# Patient Record
Sex: MALE | Race: WHITE | NOT HISPANIC OR LATINO | Employment: OTHER | URBAN - NONMETROPOLITAN AREA
[De-identification: names, ages, dates, MRNs, and addresses within clinical notes are randomized per-mention and may not be internally consistent; named-entity substitution may affect disease eponyms.]

---

## 2024-06-26 ENCOUNTER — HOSPITAL ENCOUNTER (INPATIENT)
Facility: HOSPITAL | Age: 59
LOS: 2 days | Discharge: HOME | DRG: 571 | End: 2024-06-28
Attending: STUDENT IN AN ORGANIZED HEALTH CARE EDUCATION/TRAINING PROGRAM | Admitting: INTERNAL MEDICINE
Payer: MEDICARE

## 2024-06-26 ENCOUNTER — APPOINTMENT (OUTPATIENT)
Dept: CT IMAGING | Facility: HOSPITAL | Age: 59
DRG: 571 | End: 2024-06-26
Payer: MEDICARE

## 2024-06-26 ENCOUNTER — APPOINTMENT (OUTPATIENT)
Dept: GENERAL RADIOLOGY | Facility: HOSPITAL | Age: 59
DRG: 571 | End: 2024-06-26
Payer: MEDICARE

## 2024-06-26 DIAGNOSIS — N30.90 CYSTITIS: ICD-10-CM

## 2024-06-26 DIAGNOSIS — I87.2 VENOUS INSUFFICIENCY (CHRONIC) (PERIPHERAL): ICD-10-CM

## 2024-06-26 DIAGNOSIS — L97.922 NON-PRESSURE CHRONIC ULCER OF LEFT LOWER LEG WITH FAT LAYER EXPOSED: ICD-10-CM

## 2024-06-26 DIAGNOSIS — E87.1 HYPONATREMIA: Primary | ICD-10-CM

## 2024-06-26 DIAGNOSIS — L08.9 WOUND INFECTION: ICD-10-CM

## 2024-06-26 DIAGNOSIS — T14.8XXA WOUND INFECTION: ICD-10-CM

## 2024-06-26 PROBLEM — D64.9 ANEMIA: Status: ACTIVE | Noted: 2024-06-26

## 2024-06-26 PROBLEM — L97.929 LEG ULCER, LEFT: Status: ACTIVE | Noted: 2024-06-26

## 2024-06-26 PROBLEM — E66.01 MORBID OBESITY: Status: ACTIVE | Noted: 2024-06-26

## 2024-06-26 PROBLEM — N30.00 ACUTE CYSTITIS: Status: ACTIVE | Noted: 2024-06-26

## 2024-06-26 LAB
ALBUMIN SERPL-MCNC: 3.3 G/DL (ref 3.5–5.2)
ALBUMIN/GLOB SERPL: 0.8 G/DL
ALP SERPL-CCNC: 68 U/L (ref 39–117)
ALT SERPL W P-5'-P-CCNC: 10 U/L (ref 1–41)
ANION GAP SERPL CALCULATED.3IONS-SCNC: 12 MMOL/L (ref 5–15)
APTT PPP: 28.9 SECONDS (ref 24.5–36)
AST SERPL-CCNC: 10 U/L (ref 1–40)
BACTERIA UR QL AUTO: ABNORMAL /HPF
BASOPHILS # BLD AUTO: 0.03 10*3/MM3 (ref 0–0.2)
BASOPHILS NFR BLD AUTO: 0.3 % (ref 0–1.5)
BILIRUB SERPL-MCNC: 0.8 MG/DL (ref 0–1.2)
BILIRUB UR QL STRIP: NEGATIVE
BUN SERPL-MCNC: 21 MG/DL (ref 6–20)
BUN/CREAT SERPL: 12.4 (ref 7–25)
CALCIUM SPEC-SCNC: 8.7 MG/DL (ref 8.6–10.5)
CHLORIDE SERPL-SCNC: 96 MMOL/L (ref 98–107)
CLARITY UR: ABNORMAL
CO2 SERPL-SCNC: 19 MMOL/L (ref 22–29)
COLOR UR: YELLOW
CREAT SERPL-MCNC: 1.7 MG/DL (ref 0.76–1.27)
CRP SERPL-MCNC: 23.83 MG/DL (ref 0–0.5)
D-LACTATE SERPL-SCNC: 1.2 MMOL/L (ref 0.5–2)
DEPRECATED RDW RBC AUTO: 45.7 FL (ref 37–54)
EGFRCR SERPLBLD CKD-EPI 2021: 45.9 ML/MIN/1.73
EOSINOPHIL # BLD AUTO: 0.02 10*3/MM3 (ref 0–0.4)
EOSINOPHIL NFR BLD AUTO: 0.2 % (ref 0.3–6.2)
ERYTHROCYTE [DISTWIDTH] IN BLOOD BY AUTOMATED COUNT: 13 % (ref 12.3–15.4)
GLOBULIN UR ELPH-MCNC: 4.3 GM/DL
GLUCOSE SERPL-MCNC: 123 MG/DL (ref 65–99)
GLUCOSE UR STRIP-MCNC: NEGATIVE MG/DL
HBA1C MFR BLD: 6.2 % (ref 4.8–5.6)
HCT VFR BLD AUTO: 35.4 % (ref 37.5–51)
HGB BLD-MCNC: 11.9 G/DL (ref 13–17.7)
HGB UR QL STRIP.AUTO: ABNORMAL
HYALINE CASTS UR QL AUTO: ABNORMAL /LPF
IMM GRANULOCYTES # BLD AUTO: 0.08 10*3/MM3 (ref 0–0.05)
IMM GRANULOCYTES NFR BLD AUTO: 0.7 % (ref 0–0.5)
INR PPP: 1.12 (ref 0.91–1.09)
KETONES UR QL STRIP: ABNORMAL
LEUKOCYTE ESTERASE UR QL STRIP.AUTO: ABNORMAL
LYMPHOCYTES # BLD AUTO: 1 10*3/MM3 (ref 0.7–3.1)
LYMPHOCYTES NFR BLD AUTO: 8.8 % (ref 19.6–45.3)
MAGNESIUM SERPL-MCNC: 1.7 MG/DL (ref 1.6–2.6)
MCH RBC QN AUTO: 32 PG (ref 26.6–33)
MCHC RBC AUTO-ENTMCNC: 33.6 G/DL (ref 31.5–35.7)
MCV RBC AUTO: 95.2 FL (ref 79–97)
MONOCYTES # BLD AUTO: 1.52 10*3/MM3 (ref 0.1–0.9)
MONOCYTES NFR BLD AUTO: 13.4 % (ref 5–12)
NEUTROPHILS NFR BLD AUTO: 76.6 % (ref 42.7–76)
NEUTROPHILS NFR BLD AUTO: 8.68 10*3/MM3 (ref 1.7–7)
NITRITE UR QL STRIP: NEGATIVE
NRBC BLD AUTO-RTO: 0 /100 WBC (ref 0–0.2)
PH UR STRIP.AUTO: 6 [PH] (ref 5–8)
PLATELET # BLD AUTO: 233 10*3/MM3 (ref 140–450)
PMV BLD AUTO: 9.9 FL (ref 6–12)
POTASSIUM SERPL-SCNC: 4.1 MMOL/L (ref 3.5–5.2)
PROT SERPL-MCNC: 7.6 G/DL (ref 6–8.5)
PROT UR QL STRIP: ABNORMAL
PROTHROMBIN TIME: 14.8 SECONDS (ref 11.8–14.8)
RBC # BLD AUTO: 3.72 10*6/MM3 (ref 4.14–5.8)
RBC # UR STRIP: ABNORMAL /HPF
REF LAB TEST METHOD: ABNORMAL
SODIUM SERPL-SCNC: 127 MMOL/L (ref 136–145)
SP GR UR STRIP: 1.02 (ref 1–1.03)
SQUAMOUS #/AREA URNS HPF: ABNORMAL /HPF
UROBILINOGEN UR QL STRIP: ABNORMAL
WBC # UR STRIP: ABNORMAL /HPF
WBC NRBC COR # BLD AUTO: 11.33 10*3/MM3 (ref 3.4–10.8)

## 2024-06-26 PROCEDURE — 83605 ASSAY OF LACTIC ACID: CPT | Performed by: STUDENT IN AN ORGANIZED HEALTH CARE EDUCATION/TRAINING PROGRAM

## 2024-06-26 PROCEDURE — 86140 C-REACTIVE PROTEIN: CPT | Performed by: STUDENT IN AN ORGANIZED HEALTH CARE EDUCATION/TRAINING PROGRAM

## 2024-06-26 PROCEDURE — 85730 THROMBOPLASTIN TIME PARTIAL: CPT

## 2024-06-26 PROCEDURE — 87147 CULTURE TYPE IMMUNOLOGIC: CPT

## 2024-06-26 PROCEDURE — 85025 COMPLETE CBC W/AUTO DIFF WBC: CPT

## 2024-06-26 PROCEDURE — 87077 CULTURE AEROBIC IDENTIFY: CPT

## 2024-06-26 PROCEDURE — 87070 CULTURE OTHR SPECIMN AEROBIC: CPT

## 2024-06-26 PROCEDURE — 25810000003 SODIUM CHLORIDE 0.9 % SOLUTION: Performed by: INTERNAL MEDICINE

## 2024-06-26 PROCEDURE — 87641 MR-STAPH DNA AMP PROBE: CPT | Performed by: INTERNAL MEDICINE

## 2024-06-26 PROCEDURE — 25010000002 VANCOMYCIN 1 G RECONSTITUTED SOLUTION 1 EACH VIAL: Performed by: STUDENT IN AN ORGANIZED HEALTH CARE EDUCATION/TRAINING PROGRAM

## 2024-06-26 PROCEDURE — 73701 CT LOWER EXTREMITY W/DYE: CPT

## 2024-06-26 PROCEDURE — 36415 COLL VENOUS BLD VENIPUNCTURE: CPT

## 2024-06-26 PROCEDURE — 25010000002 CEFEPIME PER 500 MG: Performed by: STUDENT IN AN ORGANIZED HEALTH CARE EDUCATION/TRAINING PROGRAM

## 2024-06-26 PROCEDURE — 81001 URINALYSIS AUTO W/SCOPE: CPT

## 2024-06-26 PROCEDURE — 25810000003 SODIUM CHLORIDE 0.9 % SOLUTION 500 ML FLEX CONT: Performed by: STUDENT IN AN ORGANIZED HEALTH CARE EDUCATION/TRAINING PROGRAM

## 2024-06-26 PROCEDURE — 73590 X-RAY EXAM OF LOWER LEG: CPT

## 2024-06-26 PROCEDURE — 83036 HEMOGLOBIN GLYCOSYLATED A1C: CPT | Performed by: INTERNAL MEDICINE

## 2024-06-26 PROCEDURE — 87040 BLOOD CULTURE FOR BACTERIA: CPT

## 2024-06-26 PROCEDURE — 87205 SMEAR GRAM STAIN: CPT

## 2024-06-26 PROCEDURE — 25810000003 SODIUM CHLORIDE 0.9 % SOLUTION

## 2024-06-26 PROCEDURE — 87186 SC STD MICRODIL/AGAR DIL: CPT

## 2024-06-26 PROCEDURE — 25010000002 CEFTRIAXONE PER 250 MG: Performed by: INTERNAL MEDICINE

## 2024-06-26 PROCEDURE — 25010000002 ENOXAPARIN PER 10 MG: Performed by: INTERNAL MEDICINE

## 2024-06-26 PROCEDURE — 83735 ASSAY OF MAGNESIUM: CPT

## 2024-06-26 PROCEDURE — 99285 EMERGENCY DEPT VISIT HI MDM: CPT

## 2024-06-26 PROCEDURE — 25510000001 IOPAMIDOL 61 % SOLUTION: Performed by: STUDENT IN AN ORGANIZED HEALTH CARE EDUCATION/TRAINING PROGRAM

## 2024-06-26 PROCEDURE — 85610 PROTHROMBIN TIME: CPT

## 2024-06-26 PROCEDURE — 80053 COMPREHEN METABOLIC PANEL: CPT

## 2024-06-26 PROCEDURE — 87086 URINE CULTURE/COLONY COUNT: CPT

## 2024-06-26 RX ORDER — POLYETHYLENE GLYCOL 3350 17 G/17G
17 POWDER, FOR SOLUTION ORAL DAILY PRN
Status: DISCONTINUED | OUTPATIENT
Start: 2024-06-26 | End: 2024-06-28 | Stop reason: HOSPADM

## 2024-06-26 RX ORDER — ACETAMINOPHEN 160 MG/5ML
650 SOLUTION ORAL EVERY 4 HOURS PRN
Status: DISCONTINUED | OUTPATIENT
Start: 2024-06-26 | End: 2024-06-28 | Stop reason: HOSPADM

## 2024-06-26 RX ORDER — SODIUM CHLORIDE 0.9 % (FLUSH) 0.9 %
10 SYRINGE (ML) INJECTION EVERY 12 HOURS SCHEDULED
Status: DISCONTINUED | OUTPATIENT
Start: 2024-06-26 | End: 2024-06-28 | Stop reason: HOSPADM

## 2024-06-26 RX ORDER — ALUMINA, MAGNESIA, AND SIMETHICONE 2400; 2400; 240 MG/30ML; MG/30ML; MG/30ML
15 SUSPENSION ORAL EVERY 6 HOURS PRN
Status: DISCONTINUED | OUTPATIENT
Start: 2024-06-26 | End: 2024-06-28 | Stop reason: HOSPADM

## 2024-06-26 RX ORDER — SODIUM CHLORIDE 0.9 % (FLUSH) 0.9 %
10 SYRINGE (ML) INJECTION AS NEEDED
Status: DISCONTINUED | OUTPATIENT
Start: 2024-06-26 | End: 2024-06-28 | Stop reason: HOSPADM

## 2024-06-26 RX ORDER — ACETAMINOPHEN 325 MG/1
650 TABLET ORAL EVERY 4 HOURS PRN
Status: DISCONTINUED | OUTPATIENT
Start: 2024-06-26 | End: 2024-06-28 | Stop reason: HOSPADM

## 2024-06-26 RX ORDER — LISINOPRIL 10 MG/1
10 TABLET ORAL
Status: DISCONTINUED | OUTPATIENT
Start: 2024-06-26 | End: 2024-06-28 | Stop reason: HOSPADM

## 2024-06-26 RX ORDER — HYDROCODONE BITARTRATE AND ACETAMINOPHEN 5; 325 MG/1; MG/1
1 TABLET ORAL EVERY 6 HOURS PRN
Status: DISCONTINUED | OUTPATIENT
Start: 2024-06-26 | End: 2024-06-28 | Stop reason: HOSPADM

## 2024-06-26 RX ORDER — ONDANSETRON 2 MG/ML
4 INJECTION INTRAMUSCULAR; INTRAVENOUS EVERY 6 HOURS PRN
Status: DISCONTINUED | OUTPATIENT
Start: 2024-06-26 | End: 2024-06-28 | Stop reason: HOSPADM

## 2024-06-26 RX ORDER — BISACODYL 10 MG
10 SUPPOSITORY, RECTAL RECTAL DAILY PRN
Status: DISCONTINUED | OUTPATIENT
Start: 2024-06-26 | End: 2024-06-28 | Stop reason: HOSPADM

## 2024-06-26 RX ORDER — LISINOPRIL 10 MG/1
10 TABLET ORAL DAILY
COMMUNITY
End: 2024-07-10 | Stop reason: SDUPTHER

## 2024-06-26 RX ORDER — AMOXICILLIN 250 MG
2 CAPSULE ORAL 2 TIMES DAILY PRN
Status: DISCONTINUED | OUTPATIENT
Start: 2024-06-26 | End: 2024-06-28 | Stop reason: HOSPADM

## 2024-06-26 RX ORDER — UREA 10 %
5 LOTION (ML) TOPICAL NIGHTLY PRN
Status: DISCONTINUED | OUTPATIENT
Start: 2024-06-26 | End: 2024-06-28 | Stop reason: HOSPADM

## 2024-06-26 RX ORDER — ACETAMINOPHEN 650 MG/1
650 SUPPOSITORY RECTAL EVERY 4 HOURS PRN
Status: DISCONTINUED | OUTPATIENT
Start: 2024-06-26 | End: 2024-06-28 | Stop reason: HOSPADM

## 2024-06-26 RX ORDER — CLINDAMYCIN PHOSPHATE 600 MG/50ML
600 INJECTION, SOLUTION INTRAVENOUS ONCE
Status: DISCONTINUED | OUTPATIENT
Start: 2024-06-26 | End: 2024-06-26

## 2024-06-26 RX ORDER — PHENAZOPYRIDINE HYDROCHLORIDE 200 MG/1
200 TABLET, FILM COATED ORAL
Status: COMPLETED | OUTPATIENT
Start: 2024-06-26 | End: 2024-06-28

## 2024-06-26 RX ORDER — SODIUM CHLORIDE 9 MG/ML
40 INJECTION, SOLUTION INTRAVENOUS AS NEEDED
Status: DISCONTINUED | OUTPATIENT
Start: 2024-06-26 | End: 2024-06-28 | Stop reason: HOSPADM

## 2024-06-26 RX ORDER — BISACODYL 5 MG/1
5 TABLET, DELAYED RELEASE ORAL DAILY PRN
Status: DISCONTINUED | OUTPATIENT
Start: 2024-06-26 | End: 2024-06-28 | Stop reason: HOSPADM

## 2024-06-26 RX ORDER — SODIUM CHLORIDE 9 MG/ML
100 INJECTION, SOLUTION INTRAVENOUS ONCE
Status: COMPLETED | OUTPATIENT
Start: 2024-06-26 | End: 2024-06-26

## 2024-06-26 RX ORDER — ENOXAPARIN SODIUM 100 MG/ML
40 INJECTION SUBCUTANEOUS EVERY 12 HOURS
Status: DISCONTINUED | OUTPATIENT
Start: 2024-06-26 | End: 2024-06-28 | Stop reason: HOSPADM

## 2024-06-26 RX ADMIN — PHENAZOPYRIDINE HYDROCHLORIDE 200 MG: 200 TABLET ORAL at 18:32

## 2024-06-26 RX ADMIN — SODIUM CHLORIDE 100 ML/HR: 9 INJECTION, SOLUTION INTRAVENOUS at 18:31

## 2024-06-26 RX ADMIN — IOPAMIDOL 100 ML: 612 INJECTION, SOLUTION INTRAVENOUS at 12:55

## 2024-06-26 RX ADMIN — CEFEPIME 2000 MG: 2 INJECTION, POWDER, FOR SOLUTION INTRAVENOUS at 13:18

## 2024-06-26 RX ADMIN — VANCOMYCIN HYDROCHLORIDE 3000 MG: 1 INJECTION, POWDER, LYOPHILIZED, FOR SOLUTION INTRAVENOUS at 13:29

## 2024-06-26 RX ADMIN — ENOXAPARIN SODIUM 40 MG: 100 INJECTION SUBCUTANEOUS at 19:57

## 2024-06-26 RX ADMIN — Medication 10 ML: at 19:58

## 2024-06-26 RX ADMIN — LISINOPRIL 10 MG: 10 TABLET ORAL at 18:35

## 2024-06-26 RX ADMIN — SODIUM CHLORIDE 1000 ML: 9 INJECTION, SOLUTION INTRAVENOUS at 13:30

## 2024-06-26 RX ADMIN — CEFTRIAXONE SODIUM 2000 MG: 2 INJECTION, POWDER, FOR SOLUTION INTRAMUSCULAR; INTRAVENOUS at 19:55

## 2024-06-26 NOTE — PROGRESS NOTES
"Pharmacy Dosing Service  Pharmacokinetics  Vancomycin Initial Evaluation  Assessment/Action/Plan:  Loading dose?: Vancomycin 3000 mg IVPB once  Current Order: Vancomycin 750 mg IVPB every 12 hours  Current end date: 06/29/2024  Levels: Obtain Vancomycin trough prior to dose on 06/28/2024 at 1900  Additional antimicrobial agent(s): Ceftriaxone  MRSA Nasal PCR ordered: Yes (Vancomycin indication is pneumonia, bone/joint, SSTI or IAI)    Vancomycin dosage initiated based on population pharmacokinetic parameters. Pharmacy will continue to follow daily and adjust dose accordingly.     Subjective:  Kirt Ojeda is a 59 y.o. male with a Vancomycin \"Pharmacy to Dose\" consult for the treatment of SSTI , day 1 of 3 of treatment.    AUC Loading dose: N/A  Regimen: 750 mg IV every 12 hours.  Start time: 08:00 on 06/27/2024  Exposure target: AUC24 (range) 400-600 mg/L.hr   AUC24,ss: 539 mg/L.hr  PAUC*: 71 %  Ctrough,ss: 17.3 mg/L  Pconc*: 42 %  Tox.: 13 %  Model Data:      Objective:  Ht: 182.9 cm (72\"); Wt: (!) 164 kg (361 lb)  Estimated Creatinine Clearance: 74.1 mL/min (A) (by C-G formula based on SCr of 1.7 mg/dL (H)).   Creatinine   Date Value Ref Range Status   06/26/2024 1.70 (H) 0.76 - 1.27 mg/dL Final      Lab Results   Component Value Date    WBC 11.33 (H) 06/26/2024      Baseline culture results:  Microbiology Results (last 10 days)       Procedure Component Value - Date/Time    Wound Culture - Wound, Leg, Left [[id number]] Collected: 06/26/24 1115    Lab Status: Preliminary result Specimen: Wound from Leg, Left Updated: 06/26/24 1817     Gram Stain Rare (1+) Gram positive cocci            Althea Bernal, PharmD  06/26/24 18:44 CDT    "

## 2024-06-26 NOTE — PLAN OF CARE
Goal Outcome Evaluation:  Plan of Care Reviewed With: patient        Progress: no change       Pt a/o x4. Came to the floor around 1600. Room air. Up adlib. No c/o pain. Wound to LLE. Voiding. PPP. VSS. FLOWERS. Call light within reach. Safety maintained.

## 2024-06-26 NOTE — H&P
[facility]  HISTORY AND PHYSICAL    Date of Admission: 6/26/2024  Primary Care Physician: Omid Sneed MD    Subjective   Primary Historian: Patient    Chief Complaint: Pain with urination; left lower extremity wounds    History of Present Illness  Patient is a 59-year-old  male with past medical history significant for hypertension that presented to our facility with a chief complaint of dysuria in addition to worsening left lower extremity wounds.  Patient states for the past couple of weeks he has been experiencing pain with urination, more dark and concentrated appearing urine, in addition to malodorous urine.  He denies having any known fever, but he does describe having episodes of chills alternating with sweats.  He reports that he is trying to be more diligent with drinking fluids and water, but unfortunately his symptoms have not improved.  He denies any previous history of a bladder infection.  He denies any flank pain.  He denies any nausea or vomiting.  In addition, he reports having a chronic wound involving his left lower extremity, but he does admit that his wound is looking much worse particularly over the past few weeks.  He denies any purulent discharge from the left lower extremity, and denies any new injury or trauma.  He denies any previous history of staph or MRSA infections.  He has a couple of sites of ulcerations which she states are getting quite a bit bigger especially over the past 1-2 weeks.  He reports the only medication that he takes in the outpatient setting is lisinopril.  He denies any previous history of renal insufficiency.      Review of Systems   Otherwise complete ROS reviewed and negative except as mentioned in the HPI.    Past Medical History:   Past Medical History:   Diagnosis Date    Hypertension      Past Surgical History:  Past Surgical History:   Procedure Laterality Date    GASTRIC BYPASS      HERNIA  "REPAIR      LEG SURGERY Left      Social History:  reports that he has never smoked. His smokeless tobacco use includes chew. He reports current alcohol use. He reports that he does not use drugs.    Family History: not discussed with patient during my initial assessment    Allergies:  No Known Allergies    Medications:  Prior to Admission medications    Medication Sig Start Date End Date Taking? Authorizing Provider   albuterol sulfate  (90 Base) MCG/ACT inhaler  11/9/22   ProviderManolo MD   atorvastatin (LIPITOR) 20 MG tablet  11/8/22   Manolo Crain MD   lisinopril (PRINIVIL,ZESTRIL) 20 MG tablet  11/8/22   Manolo Crain MD   predniSONE (DELTASONE) 10 MG tablet 6 tabs x2 days, 5 tabs x2 days, 4 tabs x2 days, 3 tabs x2 days, 2 tabs x2 days, 1 tab x2 days 11/17/22   Charlotte Mtz APRN     I have utilized all available immediate resources to obtain, update, or review the patient's current medications (including all prescriptions, over-the-counter products, herbals, cannabis/cannabidiol products, and vitamin/mineral/dietary (nutritional) supplements).    Objective     Vital Signs: /99 (BP Location: Left arm, Patient Position: Sitting)   Pulse 109   Temp 98.1 °F (36.7 °C) (Oral)   Resp 20   Ht 182.9 cm (72\")   Wt (!) 161 kg (356 lb)   SpO2 100%   BMI 48.28 kg/m²   Physical Exam  Vitals reviewed.   Constitutional:       General: He is not in acute distress.     Appearance: He is obese. He is not toxic-appearing.   HENT:      Head: Normocephalic.      Mouth/Throat:      Mouth: Mucous membranes are dry.      Comments: Poor dentition  Eyes:      Pupils: Pupils are equal, round, and reactive to light.   Cardiovascular:      Rate and Rhythm: Normal rate.   Pulmonary:      Effort: Pulmonary effort is normal. No respiratory distress.   Abdominal:      Tenderness: There is no abdominal tenderness. There is no guarding or rebound.   Musculoskeletal:      Left lower leg: " Edema present.   Skin:     Comments: No marked erythema involving distal LLE however skin discoloration appears more chronic, however there are multiple ulcerations noted anteriorly.  No focal site of fluctuance is appreciated; no purulent discharge.   Neurological:      General: No focal deficit present.      Mental Status: He is alert.   Psychiatric:         Mood and Affect: Mood normal.          Results Reviewed:  Lab Results (last 24 hours)       Procedure Component Value Units Date/Time    Lactic Acid, Plasma [[id number]]  (Normal) Collected: 06/26/24 1250    Specimen: Blood Updated: 06/26/24 1329     Lactate 1.2 mmol/L     Blood Culture - Blood, Arm, Left [[id number]] Collected: 06/26/24 1115    Specimen: Blood from Arm, Left Updated: 06/26/24 1147    Blood Culture - Blood, Hand, Digit Right [[id number]] Collected: 06/26/24 1112    Specimen: Blood from Hand, Digit Right Updated: 06/26/24 1146    Comprehensive Metabolic Panel [[id number]]  (Abnormal) Collected: 06/26/24 1112    Specimen: Blood Updated: 06/26/24 1146     Glucose 123 mg/dL      BUN 21 mg/dL      Creatinine 1.70 mg/dL      Sodium 127 mmol/L      Potassium 4.1 mmol/L      Chloride 96 mmol/L      CO2 19.0 mmol/L      Calcium 8.7 mg/dL      Total Protein 7.6 g/dL      Albumin 3.3 g/dL      ALT (SGPT) 10 U/L      AST (SGOT) 10 U/L      Alkaline Phosphatase 68 U/L      Total Bilirubin 0.8 mg/dL      Globulin 4.3 gm/dL      A/G Ratio 0.8 g/dL      BUN/Creatinine Ratio 12.4     Anion Gap 12.0 mmol/L      eGFR 45.9 mL/min/1.73     Narrative:      GFR Normal >60  Chronic Kidney Disease <60  Kidney Failure <15      Magnesium [[id number]]  (Normal) Collected: 06/26/24 1112    Specimen: Blood Updated: 06/26/24 1146     Magnesium 1.7 mg/dL     C-reactive Protein [[id number]]  (Abnormal) Collected: 06/26/24 1112    Specimen: Blood Updated: 06/26/24 1146     C-Reactive Protein 23.83 mg/dL     Protime-INR [[id number]]  (Abnormal) Collected: 06/26/24 1112    Specimen:  Blood Updated: 06/26/24 1136     Protime 14.8 Seconds      INR 1.12    aPTT [[id number]]  (Normal) Collected: 06/26/24 1112    Specimen: Blood Updated: 06/26/24 1136     PTT 28.9 seconds     Urinalysis, Microscopic Only - Urine, Clean Catch [[id number]]  (Abnormal) Collected: 06/26/24 1114    Specimen: Urine, Clean Catch Updated: 06/26/24 1131     RBC, UA Too Numerous to Count /HPF      WBC, UA Too Numerous to Count /HPF      Bacteria, UA 2+ /HPF      Squamous Epithelial Cells, UA 0-2 /HPF      Hyaline Casts, UA None Seen /LPF      Methodology Automated Microscopy    Urine Culture - Urine, Urine, Clean Catch [[id number]] Collected: 06/26/24 1114    Specimen: Urine, Clean Catch Updated: 06/26/24 1131    Urinalysis With Culture If Indicated - Urine, Clean Catch [[id number]]  (Abnormal) Collected: 06/26/24 1114    Specimen: Urine, Clean Catch Updated: 06/26/24 1126     Color, UA Yellow     Appearance, UA Cloudy     pH, UA 6.0     Specific Gravity, UA 1.020     Glucose, UA Negative     Ketones, UA Trace     Bilirubin, UA Negative     Blood, UA Large (3+)     Protein,  mg/dL (2+)     Leuk Esterase, UA Large (3+)     Nitrite, UA Negative     Urobilinogen, UA 2.0 E.U./dL    Narrative:      In absence of clinical symptoms, the presence of pyuria, bacteria, and/or nitrites on the urinalysis result does not correlate with infection.    CBC & Differential [[id number]]  (Abnormal) Collected: 06/26/24 1112    Specimen: Blood Updated: 06/26/24 1124    Narrative:      The following orders were created for panel order CBC & Differential.  Procedure                               Abnormality         Status                     ---------                               -----------         ------                     CBC Auto Differential[[id number]]        Abnormal            Final result                 Please view results for these tests on the individual orders.    CBC Auto Differential [[id number]]  (Abnormal) Collected: 06/26/24 1112     Specimen: Blood Updated: 06/26/24 1124     WBC 11.33 10*3/mm3      RBC 3.72 10*6/mm3      Hemoglobin 11.9 g/dL      Hematocrit 35.4 %      MCV 95.2 fL      MCH 32.0 pg      MCHC 33.6 g/dL      RDW 13.0 %      RDW-SD 45.7 fl      MPV 9.9 fL      Platelets 233 10*3/mm3      Neutrophil % 76.6 %      Lymphocyte % 8.8 %      Monocyte % 13.4 %      Eosinophil % 0.2 %      Basophil % 0.3 %      Immature Grans % 0.7 %      Neutrophils, Absolute 8.68 10*3/mm3      Lymphocytes, Absolute 1.00 10*3/mm3      Monocytes, Absolute 1.52 10*3/mm3      Eosinophils, Absolute 0.02 10*3/mm3      Basophils, Absolute 0.03 10*3/mm3      Immature Grans, Absolute 0.08 10*3/mm3      nRBC 0.0 /100 WBC     Wound Culture - Wound, Leg, Left [[id number]] Collected: 06/26/24 1115    Specimen: Wound from Leg, Left Updated: 06/26/24 1121          Imaging Results (Last 24 Hours)       Procedure Component Value Units Date/Time    CT Lower Extremity Left With Contrast [[id number]] Collected: 06/26/24 1357     Updated: 06/26/24 1405    Narrative:      CT LOWER EXTREMITY LEFT W CONTRAST- 6/26/2024 11:58 AM     HISTORY: eval for deep space infectino or any necrotizing infection;  E87.4-Slzh-cmdiwvmzbi and hyponatremia; N30.90-Cystitis, unspecified  without hematuria      COMPARISON: Plain films 6/26/2024     TOTAL DOSE LENGTH PRODUCT: 459.72 mGy.cm. Automated exposure control was  also utilized to decrease patient radiation dose.     TECHNIQUE: Axial images of the left lower extremity are obtained from  the level of the proximal tibia fibula through the mid to distal foot  following IV contrast. Sagittal and coronal images reformatted.     FINDINGS: There is near complete circumferential soft tissue thickening  of the distal left lower extremity (sparing only posterior skin and soft  tissues) with underlying subcutaneous edema. There are varicosities  within the superficial tissues of the medial proximal and distal left  lower extremity. There is no  subcutaneous emphysema. No loculated soft  tissue abscess collection. Underlying bony structures are preserved  without evidence of osteomyelitis. There are arthritic changes of the  talonavicular joint. Mild to moderate calcaneal spurring.          Impression:      Diffuse soft tissue thickening and underlying edema of the superficial  soft tissues of the distal left lower extremity from the visible  proximal calf through the foot favoring cellulitis. No subcutaneous  emphysema to suggest necrotizing infection. No loculated soft tissue  abscess collection. Varicosities within the medial soft tissues. No  underlying evidence of osteomyelitis.     This report was signed and finalized on 6/26/2024 2:02 PM by Dr. Trina Kennedy MD.       XR Tibia Fibula 2 View Left [[id number]] Collected: 06/26/24 1131     Updated: 06/26/24 1136    Narrative:      EXAMINATION: XR TIBIA FIBULA 2 VW LEFT-  6/26/2024 11:31 AM     HISTORY: Ulcerated wound to anterior shin.     FINDINGS: 2 view exam of the left tibia and fibula demonstrates no  evidence of acute or chronic osteomyelitis. No soft tissue gas is  present. There is soft tissue swelling involving the pretibial soft  tissues along the anterior tibial shaft. Phleboliths are noted within  the left lower extremity. There is mild diffuse soft tissue  swelling/edema.       Impression:      1.. No evidence of fracture or osteomyelitis.  2. Soft tissue swelling/prominence involving the anterior pretibial soft  tissues.     This report was signed and finalized on 6/26/2024 11:33 AM by Dr. Alexys Boogie MD.             I have personally reviewed and interpreted the radiology studies and ECG obtained at time of admission.     Assessment / Plan   Assessment:   Active Hospital Problems    Diagnosis     **Wound infection     Acute cystitis     Morbid obesity     Hyponatremia     Anemia     Leg ulcer, left        Treatment Plan  IV Vancomycin and Rocephin for now  Follow-up urine  culture  Will give 1 Liter of IVFs  Repeat BMP in AM; baseline Cr unknown  Wound care consultation  Resume Lisinopril; monitor BP trend  Lovenox PPx  Short course of PO Pyridium  Workup ongoing    Medical Decision Making  Number and Complexity of problems: 2 acute; high complexity      Conditions and Status        Condition is worsening.     MDM Data  External documents reviewed: none  Cardiac tracing (EKG, telemetry) interpretation: no EKGs available  Radiology interpretation: CT LLE results reviewed  Labs reviewed: as above  Any tests that were considered but not ordered: none     Decision rules/scores evaluated (example [id number], Wells, etc): none     Discussed with: patient     Care Planning  Shared decision making: Discussed with patient with agreement to proceed with treatment plan as outlined  Code status and discussions: Full code    Disposition  Social Determinants of Health that impact treatment or disposition: None apparent at this time  Estimated length of stay is 2 to 3 days    I confirmed that the patient's advanced care plan is present, code status is documented, and a surrogate decision maker is listed in the patient's medical record.       Electronically signed by Volodymyr Stahl MD, 06/26/24, 14:50 CDT.

## 2024-06-26 NOTE — ED NOTES
"Nursing report ED to floor  Kirt Ojeda  59 y.o.  male    HPI:   Chief Complaint   Patient presents with    Headache    Dysuria       Admitting doctor:   Volodymyr Stahl MD    Consulting provider(s):  Consults       Date and Time Order Name Status Description    6/26/2024  1:08 PM Inpatient Wound Care MD Consult               Admitting diagnosis:   The primary encounter diagnosis was Hyponatremia. A diagnosis of Cystitis was also pertinent to this visit.    Code status:   Current Code Status       Date Active Code Status Order ID Comments User Context       6/26/2024 1443 CPR (Attempt to Resuscitate) [id number]  Volodymyr Stahl MD ED        Question Answer    Code Status (Patient has no pulse and is not breathing) CPR (Attempt to Resuscitate)    Medical Interventions (Patient has pulse or is breathing) Full Support                    Allergies:   Patient has no known allergies.    Intake and Output    Intake/Output Summary (Last 24 hours) at 6/26/2024 1516  Last data filed at 6/26/2024 1443  Gross per 24 hour   Intake 100 ml   Output --   Net 100 ml       Weight:       06/26/24  1033   Weight: (!) 161 kg (356 lb)       Most recent vitals:   Vitals:    06/26/24 1033 06/26/24 1433   BP: 146/99 139/87   BP Location: Left arm    Patient Position: Sitting    Pulse: 109 87   Resp: 20    Temp: 98.1 °F (36.7 °C)    TempSrc: Oral    SpO2: 100% 98%   Weight: (!) 161 kg (356 lb)    Height: 182.9 cm (72\")      Oxygen Therapy: .    Active LDAs/IV Access:   Lines, Drains & Airways       Active LDAs       Name Placement date Placement time Site Days    Peripheral IV 06/26/24 1115 Anterior;Right Forearm 06/26/24  1115  Forearm  less than 1    Peripheral IV 06/26/24 1118 Anterior;Distal;Left;Upper Arm 06/26/24  1118  Arm  less than 1                    Labs (abnormal labs have a star):   Labs Reviewed   URINALYSIS W/ CULTURE IF INDICATED - Abnormal; Notable for the following components:       Result Value    Appearance, UA " Cloudy (*)     Ketones, UA Trace (*)     Blood, UA Large (3+) (*)     Protein,  mg/dL (2+) (*)     Leuk Esterase, UA Large (3+) (*)     Urobilinogen, UA 2.0 E.U./dL (*)     All other components within normal limits    Narrative:     In absence of clinical symptoms, the presence of pyuria, bacteria, and/or nitrites on the urinalysis result does not correlate with infection.   COMPREHENSIVE METABOLIC PANEL - Abnormal; Notable for the following components:    Glucose 123 (*)     BUN 21 (*)     Creatinine 1.70 (*)     Sodium 127 (*)     Chloride 96 (*)     CO2 19.0 (*)     Albumin 3.3 (*)     eGFR 45.9 (*)     All other components within normal limits    Narrative:     GFR Normal >60  Chronic Kidney Disease <60  Kidney Failure <15     PROTIME-INR - Abnormal; Notable for the following components:    INR 1.12 (*)     All other components within normal limits   CBC WITH AUTO DIFFERENTIAL - Abnormal; Notable for the following components:    WBC 11.33 (*)     RBC 3.72 (*)     Hemoglobin 11.9 (*)     Hematocrit 35.4 (*)     Neutrophil % 76.6 (*)     Lymphocyte % 8.8 (*)     Monocyte % 13.4 (*)     Eosinophil % 0.2 (*)     Immature Grans % 0.7 (*)     Neutrophils, Absolute 8.68 (*)     Monocytes, Absolute 1.52 (*)     Immature Grans, Absolute 0.08 (*)     All other components within normal limits   C-REACTIVE PROTEIN - Abnormal; Notable for the following components:    C-Reactive Protein 23.83 (*)     All other components within normal limits   URINALYSIS, MICROSCOPIC ONLY - Abnormal; Notable for the following components:    RBC, UA Too Numerous to Count (*)     WBC, UA Too Numerous to Count (*)     Bacteria, UA 2+ (*)     All other components within normal limits   APTT - Normal   MAGNESIUM - Normal   LACTIC ACID, PLASMA - Normal   BLOOD CULTURE   BLOOD CULTURE   WOUND CULTURE   URINE CULTURE   CBC AND DIFFERENTIAL    Narrative:     The following orders were created for panel order CBC & Differential.  Procedure                                Abnormality         Status                     ---------                               -----------         ------                     CBC Auto Differential[[id number]]        Abnormal            Final result                 Please view results for these tests on the individual orders.       Meds given in ED:   Medications   sodium chloride 0.9 % flush 10 mL (has no administration in time range)   vancomycin (VANCOCIN) 3,000 mg in sodium chloride 0.9 % 500 mL IVPB (3,000 mg Intravenous New Bag 6/26/24 1329)   sodium chloride 0.9 % bolus 1,000 mL (1,000 mL Intravenous New Bag 6/26/24 1330)   cefepime 2000 mg IVPB in 100 mL NS (MBP) (0 mg Intravenous Stopped 6/26/24 1443)   iopamidol (ISOVUE-300) 61 % injection 100 mL (100 mL Intravenous Given 6/26/24 1255)           NIH Stroke Scale:       Isolation/Infection(s):  No active isolations   No active infections     COVID Testing  Collected .  Resulted .    Nursing report ED to floor:  Mental status: A&Ox4  Ambulatory status: standby  Precautions: none    ED nurse phone extentsion- ..

## 2024-06-26 NOTE — CONSULTS
[facility] General Surgery Services - Consult Note    Patient Name: Kirt Ojeda  : 1965  MRN: [id number]  Primary Care Physician:  Omid Sneed MD  Referring Physician: No ref. provider found  Date of admission: 2024    Consults    Subjective      Reason for Consult/ Chief Complaint: LLE chronic wound    History of Present Illness: Kirt Ojeda is a 59 y.o. male presenting with a LLE chronic wound. The patient states that he had hit a metal laci ~30 years ago, and his leg became infected, leading to a relapsing/remitting infection. He was admitted to the hospital for several problems, but General Surgery is consulted for the LLE wound. +f/c. No n/v.     ROS +f/c. No n/v    Personal History     Past Medical History:   Diagnosis Date    Hypertension        Past Surgical History:   Procedure Laterality Date    GASTRIC BYPASS      HERNIA REPAIR      LEG SURGERY Left        Family History: family history is not on file. Otherwise pertinent FHx was reviewed and not pertinent to current issue.    Social History:  reports that he has never smoked. His smokeless tobacco use includes chew. He reports current alcohol use. He reports that he does not use drugs.    Home Medications:   albuterol sulfate HFA, atorvastatin, lisinopril, and predniSONE    Allergies:  No Known Allergies      Objective      Vitals:  Temp:  [98.1 °F (36.7 °C)] 98.1 °F (36.7 °C)  Heart Rate:  [109] 109  Resp:  [20] 20  BP: (146)/(99) 146/99    Physical Exam  Constitutional:       General: He is not in acute distress.     Appearance: Normal appearance.   HENT:      Head: Normocephalic and atraumatic.   Eyes:      Extraocular Movements: Extraocular movements intact.      Pupils: Pupils are equal, round, and reactive to light.   Cardiovascular:      Rate and Rhythm: Normal rate and regular rhythm.      Pulses: Normal pulses.   Pulmonary:      Effort: Pulmonary effort is normal. No respiratory distress.      Breath sounds:  Normal breath sounds.   Abdominal:      General: Bowel sounds are normal. There is no distension.      Palpations: Abdomen is soft.   Neurological:      Mental Status: He is alert.   Psychiatric:         Mood and Affect: Mood normal.         Behavior: Behavior normal.   Ext: LLE anterior shin wounds x 3, >10cm in diameter. No surrounding erythema/fluctuance. Soft compartments. 5/5 DF/PF    Result Review    Result Review:  I have personally reviewed the results from the time of this admission to 6/26/2024 13:04 CDT and agree with these findings:  [x]  Laboratory  []  Microbiology  [x]  Radiology  []  EKG/Telemetry   []  Cardiology/Vascular   []  Pathology  []  Old records  []  Other:    Assessment & Plan        Active Hospital Problems:  There are no active hospital problems to display for this patient.    LLE chronic non-healing wound    Plan:   Recommend xeroform and dry wrap to LLE  May obtain CT-LLE, if there is concern for infection/abscess  Recommend woundcare consult      James Garcia MD  General Surgery  06/26/24  13:04 CDT

## 2024-06-26 NOTE — PLAN OF CARE
Goal Outcome Evaluation:Patient is admitted from ED with LLE wound, UTI, hyponatremia. Dressing to LLE, patient is alert and oriented x 4, FLOWERS. No complaints of pain.

## 2024-06-26 NOTE — ED PROVIDER NOTES
"Subjective   History of Present Illness  Patient is a 59-year-old male that presents to the emergency department for multiple complaints.  Patient reports he has been experiencing burning with urination and intermittent fevers, chills, and bodyaches for the last 2 weeks.  He denies any abdominal pain, flank pain or CVA tenderness.  Denies any nausea, vomiting, constipation, or diarrhea.      Patient also complaining of headache.  He states that headache usually is located on the right side of the top of his head.  He denies any other complaints associated with headache.  He states that headache is most severe when urinary symptoms are most severe.  Patient reports he does not get headaches often but reports he does have history of chronic neck pain and a \"pinched nerve in the neck\" that does exacerbate headache at times as well.  He denies any visual disturbances, blurred vision, sensitivity to light or sound, lightheadedness, dizziness or near syncope. Patient reports he has not been seen by PCP or other healthcare provider regarding symptoms of headache or urinary issues.    Patient last complaint is regarding a chronic wound to the left shin.  Patient reports he hit this area on a metal pole about 20 years ago and since then has had intermittent issues with infection to the localized area.  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  He states that this episode has been ongoing for about 6 months.  He reports he has not seek treatment regarding this wound during this 6 months.  He states that he has been treating this at home on his own.  Family at bedside reports this is the worst they have seen the leg look in several years.  Patient reports he is able to ambulate on the affected extremity.  He reports no new recent fall or injury to the affected area to cause this wound.  Wound does have purulent drainage and is " malodorous.  Patient reports only past medical history includes gastric bypass, hernia repair and hypertension.  Patient reports he is supposed to be on hypertension medication but is noncompliant with medications and has not taken these in some time.  He denies any chest pain, shortness of breath, back pain or neurologic changes.        Review of Systems   Constitutional:  Positive for chills and fever.   Genitourinary:  Positive for dysuria, frequency and urgency.   Musculoskeletal:  Positive for arthralgias and myalgias.   Skin:  Positive for color change and wound.   Neurological:  Positive for headaches.   All other systems reviewed and are negative.      Past Medical History:   Diagnosis Date    Hypertension        No Known Allergies    Past Surgical History:   Procedure Laterality Date    GASTRIC BYPASS      HERNIA REPAIR      LEG SURGERY Left        Family History   Problem Relation Age of Onset    Diabetes Mother     Cancer Mother     Cancer Father        Social History     Socioeconomic History    Marital status: Legally    Tobacco Use    Smoking status: Never    Smokeless tobacco: Current     Types: Chew   Vaping Use    Vaping status: Never Used   Substance and Sexual Activity    Alcohol use: Yes     Alcohol/week: 24.0 standard drinks of alcohol     Types: 24 Cans of beer per week     Comment: social    Drug use: Never    Sexual activity: Defer           Objective   Physical Exam  Vitals and nursing note reviewed.   Constitutional:       Appearance: Normal appearance.      Comments: Nontoxic appearing. In no acute distress.    HENT:      Head: Normocephalic and atraumatic.      Right Ear: External ear normal.      Left Ear: External ear normal.      Nose: Nose normal.      Mouth/Throat:      Mouth: Mucous membranes are moist.      Pharynx: Oropharynx is clear.   Eyes:      Extraocular Movements: Extraocular movements intact.      Conjunctiva/sclera: Conjunctivae normal.      Pupils: Pupils are  equal, round, and reactive to light.   Cardiovascular:      Rate and Rhythm: Normal rate and regular rhythm.      Pulses: Normal pulses.      Heart sounds: Normal heart sounds.   Pulmonary:      Effort: Pulmonary effort is normal. No respiratory distress.      Breath sounds: Normal breath sounds. No wheezing.   Chest:      Chest wall: No tenderness.   Abdominal:      General: Abdomen is protuberant. A surgical scar is present. Bowel sounds are normal. There is no distension.      Palpations: Abdomen is soft.      Tenderness: There is no abdominal tenderness. There is no right CVA tenderness, left CVA tenderness, guarding or rebound.      Hernia: No hernia is present.   Musculoskeletal:         General: Signs of injury present. Normal range of motion.      Cervical back: Normal range of motion and neck supple.      Right lower leg: No edema.      Left lower leg: No edema.      Comments: Patient has ulcerative wound noted to the anterior portion of the left lower extremity ranging from mid shin to ankle.  Wound is malodorous.  There is scant amount of serosanguineous and purulent drainage noted from wound.  Area surrounding wound is discolored.  Patient has full range of motion to the affected extremity and is able to ambulate without difficulty.   Skin:     General: Skin is warm and dry.      Capillary Refill: Capillary refill takes less than 2 seconds.      Findings: Wound present.      Comments: Patient has ulcerative wound noted to the anterior portion of the left lower extremity ranging from mid shin to ankle.  Wound is malodorous.  There is scant amount of serosanguineous and purulent drainage noted from wound.  Area surrounding wound is discolored.     Neurological:      General: No focal deficit present.      Mental Status: He is alert and oriented to person, place, and time.   Psychiatric:         Mood and Affect: Mood normal.         Behavior: Behavior normal.         Thought Content: Thought content normal.          Judgment: Judgment normal.       Labs Reviewed   URINALYSIS W/ CULTURE IF INDICATED - Abnormal; Notable for the following components:       Result Value    Appearance, UA Cloudy (*)     Ketones, UA Trace (*)     Blood, UA Large (3+) (*)     Protein,  mg/dL (2+) (*)     Leuk Esterase, UA Large (3+) (*)     Urobilinogen, UA 2.0 E.U./dL (*)     All other components within normal limits    Narrative:     In absence of clinical symptoms, the presence of pyuria, bacteria, and/or nitrites on the urinalysis result does not correlate with infection.   COMPREHENSIVE METABOLIC PANEL - Abnormal; Notable for the following components:    Glucose 123 (*)     BUN 21 (*)     Creatinine 1.70 (*)     Sodium 127 (*)     Chloride 96 (*)     CO2 19.0 (*)     Albumin 3.3 (*)     eGFR 45.9 (*)     All other components within normal limits    Narrative:     GFR Normal >60  Chronic Kidney Disease <60  Kidney Failure <15     PROTIME-INR - Abnormal; Notable for the following components:    INR 1.12 (*)     All other components within normal limits   CBC WITH AUTO DIFFERENTIAL - Abnormal; Notable for the following components:    WBC 11.33 (*)     RBC 3.72 (*)     Hemoglobin 11.9 (*)     Hematocrit 35.4 (*)     Neutrophil % 76.6 (*)     Lymphocyte % 8.8 (*)     Monocyte % 13.4 (*)     Eosinophil % 0.2 (*)     Immature Grans % 0.7 (*)     Neutrophils, Absolute 8.68 (*)     Monocytes, Absolute 1.52 (*)     Immature Grans, Absolute 0.08 (*)     All other components within normal limits   C-REACTIVE PROTEIN - Abnormal; Notable for the following components:    C-Reactive Protein 23.83 (*)     All other components within normal limits   URINALYSIS, MICROSCOPIC ONLY - Abnormal; Notable for the following components:    RBC, UA Too Numerous to Count (*)     WBC, UA Too Numerous to Count (*)     Bacteria, UA 2+ (*)     All other components within normal limits   HEMOGLOBIN A1C - Abnormal; Notable for the following components:    Hemoglobin  A1C 6.20 (*)     All other components within normal limits    Narrative:     Hemoglobin A1C Ranges:    Increased Risk for Diabetes  5.7% to 6.4%  Diabetes                     >= 6.5%  Diabetic Goal                < 7.0%   APTT - Normal   MAGNESIUM - Normal   LACTIC ACID, PLASMA - Normal   WOUND CULTURE   BLOOD CULTURE   BLOOD CULTURE   URINE CULTURE   MRSA SCREEN, PCR   CBC AND DIFFERENTIAL    Narrative:     The following orders were created for panel order CBC & Differential.  Procedure                               Abnormality         Status                     ---------                               -----------         ------                     CBC Auto Differential[[id number]]        Abnormal            Final result                 Please view results for these tests on the individual orders.      CT Lower Extremity Left With Contrast   Final Result   Diffuse soft tissue thickening and underlying edema of the superficial   soft tissues of the distal left lower extremity from the visible   proximal calf through the foot favoring cellulitis. No subcutaneous   emphysema to suggest necrotizing infection. No loculated soft tissue   abscess collection. Varicosities within the medial soft tissues. No   underlying evidence of osteomyelitis.       This report was signed and finalized on 6/26/2024 2:02 PM by Dr. Trina Kennedy MD.          XR Tibia Fibula 2 View Left   Final Result   1.. No evidence of fracture or osteomyelitis.   2. Soft tissue swelling/prominence involving the anterior pretibial soft   tissues.       This report was signed and finalized on 6/26/2024 11:33 AM by Dr. Alexys Boogie MD.                Procedures           ED Course  ED Course as of 06/26/24 1956 Wed Jun 26, 2024   1233 LRINEC Score for Necrotizing Soft Tissue Infection : 9 points [KF]   1239 LRINEC 9. Dr. Garcia called. He will evaluate. Requests CT W contrast. Clinically there are these superficial wounds that have been there for  "a while and are worsening for the patient, but no erythema induration warmth moving up the leg, no hemorrhagic bullae, and symptoms have been subacute for a day or 2 so clinically and does not seem consistent with neck Fash. [AS]   1245 Dr. Garcia evaluated at bedside also has a lower suspicion for neck Fash.  Will plan to do Vanco and cefepime to cover for Pseudomonas or MRSA.  CT is ordered per Dr. Garcia's request but patient will be admitted to the hospital service for further management.   [AS]   1302 Hospitalist paged at this time regarding need for admission. [KF]      ED Course User Index  [AS] Terence Leonard MD  [KF] Jeffery Fonseca, ESTEFANI                                             Medical Decision Making  Kirt Oejda is a 59 y.o. male who presents to the ED for multiple complaints.  Patient reports he has been experiencing burning with urination and intermittent fevers, chills, and bodyaches for the last 2 weeks.  He denies any abdominal pain, flank pain or CVA tenderness.  Denies any nausea, vomiting, constipation, or diarrhea.      Patient also complaining of headache.  He states that headache usually is located on the right side of the top of his head.  He denies any other complaints associated with headache.  He states that headache is most severe when urinary symptoms are most severe.  Patient reports he does not get headaches often but reports he does have history of chronic neck pain and a \"pinched nerve in the neck\" that does exacerbate headache at times as well.  He denies any visual disturbances, blurred vision, sensitivity to light or sound, lightheadedness, dizziness or near syncope. Patient reports he has not been seen by PCP or other healthcare provider regarding symptoms of headache or urinary issues.    Patient last complaint is regarding a chronic wound to the left shin.  Patient reports he hit this area on a metal pole about 20 years ago and since then has had intermittent " issues with infection to the localized area.  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  He states that this episode has been ongoing for about 6 months.  He reports he has not seek treatment regarding this wound during this 6 months.  He states that he has been treating this at home on his own.  Family at bedside reports this is the worst they have seen the leg look in several years.  Patient reports he is able to ambulate on the affected extremity.  He reports no new recent fall or injury to the affected area to cause this wound.  Wound does have purulent drainage and is malodorous.  Patient reports only past medical history includes gastric bypass, hernia repair and hypertension.  Patient reports he is supposed to be on hypertension medication but is noncompliant with medications and has not taken these in some time.  He denies any chest pain, shortness of breath, back pain or neurologic changes.    Patient was non-toxic appearing on arrival. No acute distress was noted.  Vital signs stable.     Past medical history, surgical history, and medication regimen reviewed.     Previous notes, labs, imaging, and more reviewed.    Patient's presentation raises suspicion for differentials including, but not limited to, urinary tract infection, pyelonephritis, nephrolithiasis, sepsis, infected wound, gangrene, tension headache.     Please refer to above section of note for lab and imaging results that were reviewed and interpreted by radiology as well as attending physician.     Medications administered,   sodium chloride 0.9 % bolus 1,000 mL (1,000 mL Intravenous New Bag 6/26/24 1330)  vancomycin (VANCOCIN) 3,000 mg in sodium chloride 0.9 % 500 mL IVPB (3,000 mg Intravenous New Bag 6/26/24 1329)  cefepime 2000 mg IVPB in 100 mL NS (MBP) (2,000 mg Intravenous New Bag 6/26/24 1318)  iopamidol (ISOVUE-300) 61 % injection 100 mL (100  mL Intravenous Given 6/26/24 7275)     Patient's presentation as well as physical exam findings discussed with attending physician, Dr. Leonard who assessed patient as well and placed orders for antibiotics.  He also spoke with general surgeon, Dr. Garcia who assessed patient in the ED as well.  He also spoke with Dr. Wadsworth, hospitalist on-call regarding admission who accepted patient for admission to Dr. Asencio's care.    Given findings described above, patient's presentation is likely consistent with hyponatremia, UTI, and wound infection.  I have low suspicion for necrotizing fasciitis.     I reassessed the patient and discussed the findings of the work up so far with everyone in the room. I said that the next step in treatment would be admission to the hospital for further workup and care. I also said that there is always some diagnostic uncertainty in the ER, that symptoms may change, and new things may be found after being admitted. The hospitalist service assumed primary care of the patient and the patient was admitted to their service in stable condition.    Dragon disclaimer:  Parts of this note may be an electronic transcription/translation of spoken language to printed text using the Dragon dictation system.    Problems Addressed:  Cystitis: complicated acute illness or injury  Hyponatremia: complicated acute illness or injury    Amount and/or Complexity of Data Reviewed  Labs: ordered.  Radiology: ordered.    Risk  Prescription drug management.  Decision regarding hospitalization.        Final diagnoses:   Hyponatremia   Cystitis   Wound infection       ED Disposition  ED Disposition       ED Disposition   Decision to Admit    Condition   --    Comment   Level of Care: Med/Surg [1]   Diagnosis: Wound infection [695297]   Admitting Physician: PHUONG ASENCIO [[id number]]   Attending Physician: PHUONG ASENCIO [[id number]]   Certification: I Certify That Inpatient Hospital Services Are Medically  Necessary For Greater Than 2 Midnights                 No follow-up provider specified.       Medication List        ASK your doctor about these medications      lisinopril 10 MG tablet  Commonly known as: SCAR FLAHERTY  Ask about: Which instructions should I use?                 Jeffery Fonseca, APRN  06/26/24 1956

## 2024-06-27 ENCOUNTER — APPOINTMENT (OUTPATIENT)
Dept: ULTRASOUND IMAGING | Facility: HOSPITAL | Age: 59
DRG: 571 | End: 2024-06-27
Payer: MEDICARE

## 2024-06-27 PROBLEM — I10 PRIMARY HYPERTENSION: Status: ACTIVE | Noted: 2024-06-27

## 2024-06-27 PROBLEM — N18.31 STAGE 3A CHRONIC KIDNEY DISEASE: Status: ACTIVE | Noted: 2024-06-27

## 2024-06-27 LAB
ANION GAP SERPL CALCULATED.3IONS-SCNC: 9 MMOL/L (ref 5–15)
BUN SERPL-MCNC: 22 MG/DL (ref 6–20)
BUN/CREAT SERPL: 13.8 (ref 7–25)
CALCIUM SPEC-SCNC: 8.4 MG/DL (ref 8.6–10.5)
CHLORIDE SERPL-SCNC: 103 MMOL/L (ref 98–107)
CO2 SERPL-SCNC: 20 MMOL/L (ref 22–29)
CREAT SERPL-MCNC: 1.59 MG/DL (ref 0.76–1.27)
DEPRECATED RDW RBC AUTO: 45.5 FL (ref 37–54)
EGFRCR SERPLBLD CKD-EPI 2021: 49.7 ML/MIN/1.73
ERYTHROCYTE [DISTWIDTH] IN BLOOD BY AUTOMATED COUNT: 13 % (ref 12.3–15.4)
GLUCOSE SERPL-MCNC: 107 MG/DL (ref 65–99)
HCT VFR BLD AUTO: 31.7 % (ref 37.5–51)
HGB BLD-MCNC: 10.5 G/DL (ref 13–17.7)
MCH RBC QN AUTO: 31.6 PG (ref 26.6–33)
MCHC RBC AUTO-ENTMCNC: 33.1 G/DL (ref 31.5–35.7)
MCV RBC AUTO: 95.5 FL (ref 79–97)
MRSA DNA SPEC QL NAA+PROBE: NORMAL
PLATELET # BLD AUTO: 233 10*3/MM3 (ref 140–450)
PMV BLD AUTO: 10.2 FL (ref 6–12)
POTASSIUM SERPL-SCNC: 4 MMOL/L (ref 3.5–5.2)
RBC # BLD AUTO: 3.32 10*6/MM3 (ref 4.14–5.8)
SODIUM SERPL-SCNC: 132 MMOL/L (ref 136–145)
WBC NRBC COR # BLD AUTO: 7.89 10*3/MM3 (ref 3.4–10.8)

## 2024-06-27 PROCEDURE — 11045 DBRDMT SUBQ TISS EACH ADDL: CPT | Performed by: NURSE PRACTITIONER

## 2024-06-27 PROCEDURE — 25810000003 LACTATED RINGERS PER 1000 ML: Performed by: NURSE PRACTITIONER

## 2024-06-27 PROCEDURE — 25010000002 ENOXAPARIN PER 10 MG: Performed by: INTERNAL MEDICINE

## 2024-06-27 PROCEDURE — 87205 SMEAR GRAM STAIN: CPT | Performed by: NURSE PRACTITIONER

## 2024-06-27 PROCEDURE — 85027 COMPLETE CBC AUTOMATED: CPT | Performed by: INTERNAL MEDICINE

## 2024-06-27 PROCEDURE — 87070 CULTURE OTHR SPECIMN AEROBIC: CPT | Performed by: NURSE PRACTITIONER

## 2024-06-27 PROCEDURE — 99221 1ST HOSP IP/OBS SF/LOW 40: CPT | Performed by: NURSE PRACTITIONER

## 2024-06-27 PROCEDURE — 25810000003 SODIUM CHLORIDE 0.9 % SOLUTION: Performed by: INTERNAL MEDICINE

## 2024-06-27 PROCEDURE — 25010000002 HYDROMORPHONE PER 4 MG: Performed by: INTERNAL MEDICINE

## 2024-06-27 PROCEDURE — 80048 BASIC METABOLIC PNL TOTAL CA: CPT | Performed by: INTERNAL MEDICINE

## 2024-06-27 PROCEDURE — 25010000002 CEFTRIAXONE PER 250 MG: Performed by: INTERNAL MEDICINE

## 2024-06-27 PROCEDURE — 87176 TISSUE HOMOGENIZATION CULTR: CPT | Performed by: NURSE PRACTITIONER

## 2024-06-27 PROCEDURE — 87147 CULTURE TYPE IMMUNOLOGIC: CPT | Performed by: NURSE PRACTITIONER

## 2024-06-27 PROCEDURE — 0JBP0ZZ EXCISION OF LEFT LOWER LEG SUBCUTANEOUS TISSUE AND FASCIA, OPEN APPROACH: ICD-10-PCS | Performed by: SURGERY

## 2024-06-27 PROCEDURE — 11042 DBRDMT SUBQ TIS 1ST 20SQCM/<: CPT | Performed by: NURSE PRACTITIONER

## 2024-06-27 PROCEDURE — 25010000002 VANCOMYCIN 10 G RECONSTITUTED SOLUTION: Performed by: INTERNAL MEDICINE

## 2024-06-27 RX ORDER — SODIUM CHLORIDE, SODIUM LACTATE, POTASSIUM CHLORIDE, CALCIUM CHLORIDE 600; 310; 30; 20 MG/100ML; MG/100ML; MG/100ML; MG/100ML
50 INJECTION, SOLUTION INTRAVENOUS CONTINUOUS
Status: DISCONTINUED | OUTPATIENT
Start: 2024-06-27 | End: 2024-06-28 | Stop reason: HOSPADM

## 2024-06-27 RX ORDER — HYDROMORPHONE HYDROCHLORIDE 1 MG/ML
0.5 INJECTION, SOLUTION INTRAMUSCULAR; INTRAVENOUS; SUBCUTANEOUS ONCE
Status: COMPLETED | OUTPATIENT
Start: 2024-06-27 | End: 2024-06-27

## 2024-06-27 RX ORDER — LIDOCAINE 40 MG/G
1 CREAM TOPICAL ONCE
Status: COMPLETED | OUTPATIENT
Start: 2024-06-27 | End: 2024-06-27

## 2024-06-27 RX ADMIN — ENOXAPARIN SODIUM 40 MG: 100 INJECTION SUBCUTANEOUS at 20:01

## 2024-06-27 RX ADMIN — Medication 10 ML: at 20:01

## 2024-06-27 RX ADMIN — PHENAZOPYRIDINE HYDROCHLORIDE 200 MG: 200 TABLET ORAL at 11:03

## 2024-06-27 RX ADMIN — SODIUM CHLORIDE 750 MG: 9 INJECTION, SOLUTION INTRAVENOUS at 08:35

## 2024-06-27 RX ADMIN — Medication 10 ML: at 08:50

## 2024-06-27 RX ADMIN — LISINOPRIL 10 MG: 10 TABLET ORAL at 08:49

## 2024-06-27 RX ADMIN — HYDROMORPHONE HYDROCHLORIDE 0.5 MG: 1 INJECTION, SOLUTION INTRAMUSCULAR; INTRAVENOUS; SUBCUTANEOUS at 19:50

## 2024-06-27 RX ADMIN — ENOXAPARIN SODIUM 40 MG: 100 INJECTION SUBCUTANEOUS at 08:49

## 2024-06-27 RX ADMIN — PHENAZOPYRIDINE HYDROCHLORIDE 200 MG: 200 TABLET ORAL at 18:11

## 2024-06-27 RX ADMIN — SODIUM CHLORIDE 750 MG: 9 INJECTION, SOLUTION INTRAVENOUS at 19:50

## 2024-06-27 RX ADMIN — HYDROCODONE BITARTRATE AND ACETAMINOPHEN 1 TABLET: 5; 325 TABLET ORAL at 13:21

## 2024-06-27 RX ADMIN — SODIUM CHLORIDE, SODIUM LACTATE, POTASSIUM CHLORIDE, AND CALCIUM CHLORIDE 50 ML/HR: .6; .31; .03; .02 INJECTION, SOLUTION INTRAVENOUS at 11:03

## 2024-06-27 RX ADMIN — CEFTRIAXONE SODIUM 2000 MG: 2 INJECTION, POWDER, FOR SOLUTION INTRAMUSCULAR; INTRAVENOUS at 18:13

## 2024-06-27 RX ADMIN — PHENAZOPYRIDINE HYDROCHLORIDE 200 MG: 200 TABLET ORAL at 08:50

## 2024-06-27 RX ADMIN — LIDOCAINE 4% 1 APPLICATION: 4 CREAM TOPICAL at 13:21

## 2024-06-27 NOTE — PROGRESS NOTES
Inpatient Nutrition Services  Patient Name:  Kirt Ojeda  YOB: 1965  MRN: [id number]  Admit Date:  6/26/2024  Assessment Date:  6/27/2024   Reason for Assessment       Row Name 06/27/24 1525          Reason for Assessment    Reason For Assessment per organizational policy;other (see comments)  wound care patients requiring follow up list     Diagnosis infection/sepsis                    Nutrition/Diet History       Row Name 06/27/24 1525          Nutrition/Diet History    Typical Intake (Food/Fluid/EN/PN) Pt denies food allergies. Reviewed importance of nutrition for wound healing/skin integrity. Reviewed nutrients' roles in skin integrity and dietary sources of nutrients. Pt verbalized understanding. Pt enjoyed lunch today.     Food Intolerance(s) NKFA                    Labs/Tests/Procedures/Meds       Row Name 06/27/24 1530          Labs/Procedures/Meds    Lab Results Reviewed reviewed     Lab Results Comments Na, Glu, BUN, Cr, A1c 6.2%, CRP        Diagnostic Tests/Procedures    Diagnostic Test/Procedure Reviewed reviewed     Diagnostic Test/Procedures Comments s/p debridement of left lower leg wound        Medications    Pertinent Medications Reviewed reviewed     Pertinent Medications Comments See MAR                    Physical Findings       Row Name 06/27/24 1532          Physical Findings    Overall Physical Appearance Room air, nonpressure chronic ulcer of left lower leg with fat layer exposed, left lowerleg with cellulitis, 2+ edema feet, 3+ edema legs, BM 6/27, Robin Score 22.                    Estimated/Assessed Needs - Anthropometrics       Row Name 06/27/24 1533          Anthropometrics    Weight for Calculation 80.7 kg (178 lb)  IBW        Estimated/Assessed Needs    Additional Documentation Fluid Requirements (Group);KCAL/KG (Group);Protein Requirements (Group)        KCAL/KG    KCAL/KG 20 Kcal/Kg (kcal);25 Kcal/Kg (kcal)     20 Kcal/Kg (kcal) 1614.8     25 Kcal/Kg (kcal) 2018.5         Protein Requirements    Weight Used For Protein Calculations 80.7 kg (178 lb)  IBW     Est Protein Requirement Amount (gms/kg) 1.3 gm protein  hx of CKD     Estimated Protein Requirements (gms/day) 104.96        Fluid Requirements    Fluid Requirements (mL/day) 2019     RDA Method (mL) 2019                    Nutrition Prescription Ordered       Row Name 06/27/24 1533          Nutrition Prescription PO    Current PO Diet Regular     Fluid Consistency Thin     Common Modifiers Cardiac                    Evaluation of Received Nutrient/Fluid Intake       Row Name 06/27/24 1533          Nutrient/Fluid Evaluation    Number of Days Evaluated Other (comment)  admission < 24 hr        PO Evaluation    Number of Meals 1     % PO Intake 100                       Problem/Interventions:   Problem 1       Row Name 06/27/24 1534          Nutrition Diagnoses Problem 1    Problem 1 Altered Nutrition Related to Labs     Etiology (related to) Medical Diagnosis     Endocrine Pre-diabetes     Signs/Symptoms (evidenced by) Biochemical     Specific Labs Noted HgbA1C                          Intervention Goal       Row Name 06/27/24 1534          Intervention Goal    General Disease management/therapy;Meet nutritional needs for age/condition;Reduce/improve symptoms     PO Tolerate PO;Meet estimated needs     Weight Appropriate weight loss                    Nutrition Intervention       Row Name 06/27/24 1534          Nutrition Intervention    RD/Tech Action Follow Tx progress;Care plan reviewd                    Nutrition Prescription       Row Name 06/27/24 1535          Nutrition Prescription PO    PO Prescription Other (comment)  continue same protocol                    Education/Evaluation       Row Name 06/27/24 1535          Education    Education No discharge needs identified at this time        Monitor/Evaluation    Monitor Per protocol                     Electronically signed by:  Faith Davila RDN, DWIGHT  06/27/24 15:35  CDT

## 2024-06-27 NOTE — PLAN OF CARE
Goal Outcome Evaluation:  Plan of Care Reviewed With: patient        Progress: no change     Pt a/o x4 up adlib. Wound care completed per orders. Room air. Last Bm was today. Lovenox for VTE prevention. VSS. FLOWERS. Call light within reach. Safety maintained.

## 2024-06-27 NOTE — PLAN OF CARE
Goal Outcome Evaluation:              Outcome Evaluation: ALOX4. RA. up at [facility]. no c/o pain. MRSA  not detected. on IV ABX. WC consulted for LLE wound for today.

## 2024-06-27 NOTE — NURSING NOTE
[facility]  INPATIENT WOUND & OSTOMY CARE    Today's Date: 06/27/24    Patient Name: Kirt Ojeda  MRN: [id number]  CSN: [id number]  PCP: Omid Sneed MD  Attending Provider: Moncho Galvez DO  Length of Stay: 1    Wound care consulted for chronic left leg wound. Pictures have been uploaded by nursing staff. See Nelly JARA consult note for details.     Bedside debridement performed by ESTEFANI Padilla. Measurements taken prior to debridement by Nelly. Wound tissue culture sent to lab. Nelly will be ordering wound care. We dressed the wound post debridement. I also took updated pictures post debridement. His nurse Becka has been updated on plan of care.     Inpatient wound care will continue to follow during hospital stay.  Please contact if any issues or concerns arise.     This document has been electronically signed by Ania Hightower RN on 6/27/2024 13:48 CDT

## 2024-06-27 NOTE — PROGRESS NOTES
[facility] Medicine Services  INPATIENT PROGRESS NOTE    Length of Stay: 1  Date of Admission: 6/26/2024  Primary Care Physician: Omid Sneed MD    Subjective   Chief Complaint: Pain with urination, left lower extremity wounds    HPI     Kirt Ojeda presented to [facility] emergency room 6/26/2024 with painful urination and left lower extremity wound.  Patient reports for the last couple of weeks pain with urination, dark concentrated urine and malodorous urine.  Patient denied fever but did describe episodes of chills alternating with sweats.  Patient reported trying to drink more fluids and water without improvement of symptoms.  He denies previous history of bladder infection, flank pain, nausea or vomiting.  Patient does report chronic wound left lower extremity looking worse over the last few weeks.  He denied any purulent discharge or any new injury.  Patient denies previous history of staph or MRSA.  Patient reported 2 of the sites were getting bigger over the last 1 to 2 weeks.  He has a history of hypertension on lisinopril and is followed by Dr. Sneed.      Today  Sitting up in bed.  Patient reports painful urination and concentrated looking urine.  He reports 3 open areas left lower extremity and has never been followed by wound care.  He was seen by Dr. Garcia general surgery and recommended Xeroform and dry wrap left lower extremity.  Wound care also consulted.  Continue Rocephin and vancomycin.  MRSA nasal screen negative and will likely discontinue vancomycin..  Wound culture gram-negative bacilli.  Awaiting urine culture.        Review of Systems   Constitutional:  Positive for chills, diaphoresis and fatigue.   HENT:  Negative for congestion and trouble swallowing.    Eyes:  Negative for photophobia and visual disturbance.   Respiratory:  Negative for cough, shortness of breath and wheezing.    Cardiovascular:  Negative for chest pain and  palpitations.   Gastrointestinal:  Negative for diarrhea, nausea and vomiting.   Endocrine: Negative for cold intolerance, heat intolerance and polyuria.   Genitourinary:  Positive for decreased urine volume, dysuria and frequency.   Musculoskeletal:  Positive for gait problem.   Skin:  Positive for color change (Left lower extremity) and wound (3 open wounds left lower extremity).   Allergic/Immunologic: Negative for immunocompromised state.   Neurological:  Positive for weakness. Negative for light-headedness.   Hematological:  Negative for adenopathy. Does not bruise/bleed easily.   Psychiatric/Behavioral:  Negative for agitation and behavioral problems.         All pertinent negatives and positives are as above. All other systems have been reviewed and are negative unless otherwise stated.     Objective    Temp:  [97.4 °F (36.3 °C)-99 °F (37.2 °C)] 98.3 °F (36.8 °C)  Heart Rate:  [74-97] 83  Resp:  [16-20] 18  BP: ()/(55-87) 109/65    Physical Exam  Vitals and nursing note reviewed.   Constitutional:       Appearance: He is obese.      Comments: Sitting up in bed.  No oxygen use.  No visitors in room.   HENT:      Head: Normocephalic and atraumatic.      Nose: No congestion.      Mouth/Throat:      Pharynx: Oropharynx is clear. No oropharyngeal exudate or posterior oropharyngeal erythema.   Eyes:      Extraocular Movements: Extraocular movements intact.      Pupils: Pupils are equal, round, and reactive to light.   Cardiovascular:      Rate and Rhythm: Normal rate and regular rhythm.      Heart sounds: No murmur heard.  Pulmonary:      Breath sounds: No wheezing, rhonchi or rales.      Comments: No oxygen in use.  Abdominal:      Palpations: Abdomen is soft.      Tenderness: There is no abdominal tenderness.   Genitourinary:     Comments: Voiding.  Musculoskeletal:         General: Tenderness (Left lower extremity) present.      Cervical back: Normal range of motion and neck supple.   Skin:     Comments: 3  open areas left lower extremity 5 cm x 9.5 cm, 7 cm x 7 cm, 3 cm x 5 cm.  Area is open without drainage.  Xeroform in place.   Neurological:      General: No focal deficit present.      Mental Status: He is alert and oriented to person, place, and time.   Psychiatric:         Mood and Affect: Mood normal.         Behavior: Behavior normal.         Judgment: Judgment normal.           Results Review:  I have reviewed the labs, radiology results, and diagnostic studies.    Laboratory Data:      Results from last 7 days   Lab Units 06/27/24  0540 06/26/24  1112   WBC 10*3/mm3 7.89 11.33*   HEMOGLOBIN g/dL 10.5* 11.9*   HEMATOCRIT % 31.7* 35.4*   PLATELETS 10*3/mm3 233 233        Results from last 7 days   Lab Units 06/27/24  0540 06/26/24  1112   SODIUM mmol/L 132* 127*   POTASSIUM mmol/L 4.0 4.1   CHLORIDE mmol/L 103 96*   CO2 mmol/L 20.0* 19.0*   BUN mg/dL 22* 21*   CREATININE mg/dL 1.59* 1.70*   GLUCOSE mg/dL 107* 123*   CALCIUM mg/dL 8.4* 8.7   ALT (SGPT) U/L  --  10         Culture Data:      Microbiology Results (last 10 days)       Procedure Component Value - Date/Time    MRSA Screen, PCR (Inpatient) - Swab, Nares [[id number]]  (Normal) Collected: 06/26/24 2319    Lab Status: Final result Specimen: Swab from Nares Updated: 06/27/24 0040     MRSA PCR No MRSA Detected    Narrative:      The negative predictive value of this diagnostic test is high and should only be used to consider de-escalating anti-MRSA therapy. A positive result may indicate colonization with MRSA and must be correlated clinically.    Wound Culture - Wound, Leg, Left [[id number]]  (Abnormal) Collected: 06/26/24 1115    Lab Status: Preliminary result Specimen: Wound from Leg, Left Updated: 06/27/24 0813     Wound Culture Light growth (2+) Gram Negative Bacilli     Gram Stain Rare (1+) Gram positive cocci              Radiology Data:   Imaging Results (Last 72 Hours)       Procedure Component Value Units Date/Time    CT Lower Extremity Left With  Contrast [[id number]] Collected: 06/26/24 1357     Updated: 06/26/24 1405    Narrative:      CT LOWER EXTREMITY LEFT W CONTRAST- 6/26/2024 11:58 AM     HISTORY: eval for deep space infectino or any necrotizing infection;  E87.5-Lppk-[id number] and hyponatremia; N30.90-Cystitis, unspecified  without hematuria      COMPARISON: Plain films 6/26/2024     TOTAL DOSE LENGTH PRODUCT: 459.72 mGy.cm. Automated exposure control was  also utilized to decrease patient radiation dose.     TECHNIQUE: Axial images of the left lower extremity are obtained from  the level of the proximal tibia fibula through the mid to distal foot  following IV contrast. Sagittal and coronal images reformatted.     FINDINGS: There is near complete circumferential soft tissue thickening  of the distal left lower extremity (sparing only posterior skin and soft  tissues) with underlying subcutaneous edema. There are varicosities  within the superficial tissues of the medial proximal and distal left  lower extremity. There is no subcutaneous emphysema. No loculated soft  tissue abscess collection. Underlying bony structures are preserved  without evidence of osteomyelitis. There are arthritic changes of the  talonavicular joint. Mild to moderate calcaneal spurring.          Impression:      Diffuse soft tissue thickening and underlying edema of the superficial  soft tissues of the distal left lower extremity from the visible  proximal calf through the foot favoring cellulitis. No subcutaneous  emphysema to suggest necrotizing infection. No loculated soft tissue  abscess collection. Varicosities within the medial soft tissues. No  underlying evidence of osteomyelitis.     This report was signed and finalized on 6/26/2024 2:02 PM by Dr. Trina Kennedy MD.       XR Tibia Fibula 2 View Left [[id number]] Collected: 06/26/24 1131     Updated: 06/26/24 1136    Narrative:      EXAMINATION: XR TIBIA FIBULA 2 VW LEFT-  6/26/2024 11:31 AM     HISTORY: Ulcerated  wound to anterior shin.     FINDINGS: 2 view exam of the left tibia and fibula demonstrates no  evidence of acute or chronic osteomyelitis. No soft tissue gas is  present. There is soft tissue swelling involving the pretibial soft  tissues along the anterior tibial shaft. Phleboliths are noted within  the left lower extremity. There is mild diffuse soft tissue  swelling/edema.       Impression:      1.. No evidence of fracture or osteomyelitis.  2. Soft tissue swelling/prominence involving the anterior pretibial soft  tissues.     This report was signed and finalized on 6/26/2024 11:33 AM by Dr. Alexys Boogie MD.               Intake/Output    Intake/Output Summary (Last 24 hours) at 6/27/2024 1059  Last data filed at 6/26/2024 1443  Gross per 24 hour   Intake 100 ml   Output --   Net 100 ml       Scheduled Meds  cefTRIAXone, 2,000 mg, Intravenous, Q24H  enoxaparin, 40 mg, Subcutaneous, Q12H  lisinopril, 10 mg, Oral, Q24H  phenazopyridine, 200 mg, Oral, TID With Meals  sodium chloride, 10 mL, Intravenous, Q12H  vancomycin, 750 mg, Intravenous, Q12H        I have reviewed the patient current medications.     Assessment/Plan     Active Hospital Problems    Diagnosis     **Leg ulcer, left with cellulitis     Primary hypertension     Stage 3a chronic kidney disease     Wound infection     Acute cystitis     Morbid obesity     Hyponatremia     Normocytic anemia        Treatment Plan:    1.  Nonhealing leg ulcers left lower extremity with cellulitis.  3 chronic nonhealing ulcers left lower extremity.  Patient has not followed with wound care. Cefepime given in ER.z General surgery consulted and recommends Xeroform and dry wrap.  CT left lower extremity notes diffuse soft tissue thickening and underlying edema of the superficial soft tissue distal left lower extremity from the visible proximal calf through the foot favoring cellulitis.  No subcu emphysema to suggest necrotizing infection.  No loculated soft tissue  abscess collection.  Varicosities.  No underlying evidence of osteomyelitis.  Wound care consulted.  Continue Rocephin and vancomycin.  MRSA nasal screen negative. Wound culture negative bacilli.  Discontinue vancomycin.  Patient afebrile.  WBC 11.3 on admission down to 7.89 today.  Repeat CBC in AM.  CRP 28.83.  Repeat CRP in AM.    2.  Acute cystitis.  Urinalysis too numerous to count WBC, 2+ bacteria.  Culture pending.  Continue Rocephin. Pyridium for 3 days for dysuria.    3.  Hyponatremia, clinically significant.  Sodium 127 on admission.  IV fluids given.  Sodium 132 today.  Repeat CMP in AM.    4.  Suspect chronic kidney disease stage IIIa.  Unknown baseline creatinine.  And 1.7 on admission.  Creatinine 1.59 today.  Continue IV fluids.  Repeat CMP in AM.    5.  Normocytic anemia.  Hemoglobin 11.9 on admission.  IV fluids given.  Hemoglobin 10.5 today.  Denies bright red bleeding per rectum or dark tarry stools.  Check iron profile.  Repeat CBC in AM.    6.  Primary hypertension.  Blood pressure 109/65, 113/66. Continue  lisinopril. Repeat CMP in AM.    7.  Lovenox for deep vein thrombosis prophylaxis.    Medical Decision Making  Number and Complexity of problems: 6  Nonhealing ulcers left lower extremity with cellulitis: Acute on chronic, high complexity, not at baseline  Acute cystitis: Acute, moderate complexity, not at baseline   Hyponatremia clinically significant: Acute, high complexity, not at baseline  Suspect CKD stage 3a: Acute or chronic with unknown baselline  Normocytic anemia: Chronic, stable  Hypertension: Chronic, stable    Differential Diagnosis: None    Conditions and Status        Condition is unchanged.     [facility] Data  External documents reviewed: None  Cardiac tracing (EKG, telemetry) interpretation: None  Radiology interpretation: Reviewed radiology interpretation CT left lower extremity  Labs reviewed:   BMP 6/27/2024.  Repeat BMP in AM.  CBC 6/27/2024.  Repeat CBC in  AM.  Urinalysis  Wound culture  MRSA nasal screen negative    Any tests that were considered but not ordered: None     Decision rules/scores evaluated (example [id number], [place], etc): None     Discussed with: Dr. Galvez and patient.     Care Planning  Shared decision making: Dr. Galvez and patient.  Patient agrees to IV antibiotics, wound culture, wound care, IV fluids, follow-up lab work  Code status and discussions: Full code  Patient surrogate decision maker is his sister, Chani Lea  Social Determinants of Health that impact treatment or disposition: None  I expect the patient to be discharged to home in 2-4 days.     Electronically signed by ESTEFANI Jacobs, 06/27/24, 10:59 CDT.    The above documentation resulted from a face-to-face encounter by me Jolanta JARA, [facility].

## 2024-06-27 NOTE — CONSULTS
[facility]  INPATIENT WOUND & OSTOMY CONSULTATION    Today's Date: 06/27/24    Patient Name: Kirt Ojeda  MRN: [id number]  CSN: [id number]  PCP: Omid Sneed MD  Referring Provider:   Consulting Provider (From admission, onward)      Start Ordered     Status Ordering Provider    06/26/24 1758 06/26/24 1757  Inpatient Wound Care MD Consult  Once        Specialty:  Wound Care  Provider:  Nelly Sosa APRN    Acknowledged PHUONG ASENCIO    06/26/24 1309 06/26/24 1308  Inpatient Wound Care MD Consult  Once        Specialty:  Wound Care  Provider:  Nelly Sosa APRN    Acknowledged JUDITH ELAM           Attending Provider: Moncho Galvez DO  Length of Stay: 1    SUBJECTIVE   Chief Complaint: Wound to the left lower extremity    HPI: Kirt Ojeda, a 59 y.o.male, presents with a past medical history of hypertension, morbid obesity, venous insufficiency.  A full past medical history as listed below.  Inpatient wound care consulted due to wounds of left lower extremity.  Patient reports approximately 30 years ago he hit leg on a metal cart causing damage to artery.  He did have gangrene at 1 time with multiple issues of wounds over the past.  Current wounds have been present for approximately 6 months.  He states wounds have not been this bad in a long time.  He states wounds will start by skin becoming very dry and cracking causing open ulcers.  He reports a large amount of drainage coming from wound.    Visit Dx:    ICD-10-CM ICD-9-CM   1. Hyponatremia  E87.1 276.1   2. Cystitis  N30.90 595.9   3. Wound infection  T14.8XXA 958.3    L08.9    4. Non-pressure chronic ulcer of left lower leg with fat layer exposed  L97.922 707.10   5. Venous insufficiency (chronic) (peripheral)  I87.2 459.81       Hospital Problem List:     Leg ulcer, left with cellulitis    Wound infection    Acute cystitis    Morbid obesity    Hyponatremia    Normocytic anemia    Primary hypertension    Stage 3a  chronic kidney disease      History:   Past Medical History:   Diagnosis Date    Hypertension      Past Surgical History:   Procedure Laterality Date    GASTRIC BYPASS      HERNIA REPAIR      LEG SURGERY Left      Social History     Socioeconomic History    Marital status: Legally    Tobacco Use    Smoking status: Never    Smokeless tobacco: Current     Types: Chew   Vaping Use    Vaping status: Never Used   Substance and Sexual Activity    Alcohol use: Yes     Alcohol/week: 24.0 standard drinks of alcohol     Types: 24 Cans of beer per week     Comment: social    Drug use: Never    Sexual activity: Defer     Family History   Problem Relation Age of Onset    Diabetes Mother     Cancer Mother     Cancer Father        Allergies:  No Known Allergies    Medications:    Current Facility-Administered Medications:     acetaminophen (TYLENOL) tablet 650 mg, 650 mg, Oral, Q4H PRN **OR** acetaminophen (TYLENOL) 160 MG/5ML oral solution 650 mg, 650 mg, Oral, Q4H PRN **OR** acetaminophen (TYLENOL) suppository 650 mg, 650 mg, Rectal, Q4H PRN, Volodymyr Stahl MD    aluminum-magnesium hydroxide-simethicone (MAALOX MAX) 400-400-40 MG/5ML suspension 15 mL, 15 mL, Oral, Q6H PRN, Volodymyr Stahl MD    sennosides-docusate (PERICOLACE) 8.6-50 MG per tablet 2 tablet, 2 tablet, Oral, BID PRN **AND** polyethylene glycol (MIRALAX) packet 17 g, 17 g, Oral, Daily PRN **AND** bisacodyl (DULCOLAX) EC tablet 5 mg, 5 mg, Oral, Daily PRN **AND** bisacodyl (DULCOLAX) suppository 10 mg, 10 mg, Rectal, Daily PRN, Volodymyr Stahl MD    cefTRIAXone (ROCEPHIN) 2,000 mg in sodium chloride 0.9 % 100 mL MBP, 2,000 mg, Intravenous, Q24H, Volodymyr Stahl MD, Last Rate: 200 mL/hr at 06/26/24 1955, 2,000 mg at 06/26/24 1955    Enoxaparin Sodium (LOVENOX) syringe 40 mg, 40 mg, Subcutaneous, Q12H, Volodymyr Stahl MD, 40 mg at 06/27/24 0849    HYDROcodone-acetaminophen (NORCO) 5-325 MG per tablet 1 tablet, 1 tablet, Oral,  Q6H PRN, Volodymyr Stahl MD, 1 tablet at 06/27/24 1321    lactated ringers infusion, 50 mL/hr, Intravenous, Continuous, Jolanta Cristobal APRN, Last Rate: 50 mL/hr at 06/27/24 1103, 50 mL/hr at 06/27/24 1103    lisinopril (PRINIVIL,ZESTRIL) tablet 10 mg, 10 mg, Oral, Q24H, Volodymyr Stahl MD, 10 mg at 06/27/24 0849    melatonin tablet 5 mg, 5 mg, Oral, Nightly PRN, Volodymyr Stahl MD    ondansetron (ZOFRAN) injection 4 mg, 4 mg, Intravenous, Q6H PRN, Volodymyr Stahl MD    phenazopyridine (PYRIDIUM) tablet 200 mg, 200 mg, Oral, TID With Meals, Volodymyr Stahl MD, 200 mg at 06/27/24 1103    [COMPLETED] Insert Peripheral IV, , , Once **AND** sodium chloride 0.9 % flush 10 mL, 10 mL, Intravenous, PRN, Jeffery Fonseca APRN    sodium chloride 0.9 % flush 10 mL, 10 mL, Intravenous, Q12H, Volodymyr Stahl MD, 10 mL at 06/27/24 0850    sodium chloride 0.9 % flush 10 mL, 10 mL, Intravenous, PRN, Volodymyr Stahl MD    sodium chloride 0.9 % infusion 40 mL, 40 mL, Intravenous, PRN, Volodymyr Stahl MD    vancomycin 750 mg/250 mL 0.9% NS IVPB ([facility]), 750 mg, Intravenous, Q12H, Volodymyr Stahl MD, 750 mg at 06/27/24 0835    OBJECTIVE     Vitals:    06/27/24 1038   BP: 109/65   Pulse: 83   Resp: 18   Temp: 98.3 °F (36.8 °C)   SpO2: 97%       PHYSICAL EXAM:   Physical Exam  Vitals and nursing note reviewed.   Constitutional:       General: He is awake.      Appearance: He is morbidly obese.      Comments: Body mass index is 48.96 kg/m².    HENT:      Head: Normocephalic and atraumatic.   Eyes:      General: Lids are normal. Gaze aligned appropriately.   Cardiovascular:      Rate and Rhythm: Normal rate and regular rhythm.   Pulmonary:      Effort: Pulmonary effort is normal. No respiratory distress.   Musculoskeletal:      Cervical back: Normal range of motion and neck supple.   Skin:     General: Skin is warm and dry.      Findings: Wound present.      Comments: Patient has  wounds of left anterior lower leg with superior wound measuring 11.6 cm x 10.8 cm in wound bed is covered with slough subcutaneous tissue.  Left anterior distal lower leg wound measures 9.3 series by 8.8 cm with slough and subcutaneous tissue present.  Edges are attached to wound bed.  No undermining or tunneling noted.  Periwound area has chronic skin changes consistent with venous insufficiency and previous trauma.  Moderate amount of serous drainage.  No significant erythema periwound area or other signs of infection at this time.   Neurological:      Mental Status: He is alert and oriented to person, place, and time.      Motor: Weakness present.   Psychiatric:         Attention and Perception: Attention normal.         Mood and Affect: Mood normal.         Speech: Speech normal.         Behavior: Behavior is cooperative.     Pictures taken on admission by nursing staff                 Results Review:  Lab Results (last 48 hours)       Procedure Component Value Units Date/Time    Urine Culture - Urine, Urine, Clean Catch [[id number]]  (Abnormal) Collected: 06/26/24 1114    Specimen: Urine, Clean Catch Updated: 06/27/24 1222     Urine Culture >100,000 CFU/mL Gram Negative Bacilli    Narrative:      Colonization of the urinary tract without infection is common. Treatment is discouraged unless the patient is symptomatic, pregnant, or undergoing an invasive urologic procedure.    Blood Culture - Blood, Hand, Digit Right [[id number]]  (Normal) Collected: 06/26/24 1112    Specimen: Blood from Hand, Digit Right Updated: 06/27/24 1200     Blood Culture No growth at 24 hours    Wound Culture - Wound, Leg, Left [[id number]]  (Abnormal) Collected: 06/26/24 1115    Specimen: Wound from Leg, Left Updated: 06/27/24 0813     Wound Culture Light growth (2+) Gram Negative Bacilli     Gram Stain Rare (1+) Gram positive cocci    Basic Metabolic Panel [[id number]]  (Abnormal) Collected: 06/27/24 0540    Specimen: Blood Updated:  06/27/24 0622     Glucose 107 mg/dL      BUN 22 mg/dL      Creatinine 1.59 mg/dL      Sodium 132 mmol/L      Potassium 4.0 mmol/L      Chloride 103 mmol/L      CO2 20.0 mmol/L      Calcium 8.4 mg/dL      BUN/Creatinine Ratio 13.8     Anion Gap 9.0 mmol/L      eGFR 49.7 mL/min/1.73     Narrative:      GFR Normal >60  Chronic Kidney Disease <60  Kidney Failure <15      CBC (No Diff) [[id number]]  (Abnormal) Collected: 06/27/24 0540    Specimen: Blood Updated: 06/27/24 0602     WBC 7.89 10*3/mm3      RBC 3.32 10*6/mm3      Hemoglobin 10.5 g/dL      Hematocrit 31.7 %      MCV 95.5 fL      MCH 31.6 pg      MCHC 33.1 g/dL      RDW 13.0 %      RDW-SD 45.5 fl      MPV 10.2 fL      Platelets 233 10*3/mm3     MRSA Screen, PCR (Inpatient) - Swab, Nares [[id number]]  (Normal) Collected: 06/26/24 2319    Specimen: Swab from Nares Updated: 06/27/24 0040     MRSA PCR No MRSA Detected    Narrative:      The negative predictive value of this diagnostic test is high and should only be used to consider de-escalating anti-MRSA therapy. A positive result may indicate colonization with MRSA and must be correlated clinically.    Hemoglobin A1c [[id number]]  (Abnormal) Collected: 06/26/24 1112    Specimen: Blood Updated: 06/26/24 1813     Hemoglobin A1C 6.20 %     Narrative:      Hemoglobin A1C Ranges:    Increased Risk for Diabetes  5.7% to 6.4%  Diabetes                     >= 6.5%  Diabetic Goal                < 7.0%    Lactic Acid, Plasma [[id number]]  (Normal) Collected: 06/26/24 1250    Specimen: Blood Updated: 06/26/24 1329     Lactate 1.2 mmol/L     Blood Culture - Blood, Arm, Left [[id number]] Collected: 06/26/24 1115    Specimen: Blood from Arm, Left Updated: 06/26/24 1147    Comprehensive Metabolic Panel [[id number]]  (Abnormal) Collected: 06/26/24 1112    Specimen: Blood Updated: 06/26/24 1146     Glucose 123 mg/dL      BUN 21 mg/dL      Creatinine 1.70 mg/dL      Sodium 127 mmol/L      Potassium 4.1 mmol/L      Chloride 96 mmol/L       CO2 19.0 mmol/L      Calcium 8.7 mg/dL      Total Protein 7.6 g/dL      Albumin 3.3 g/dL      ALT (SGPT) 10 U/L      AST (SGOT) 10 U/L      Alkaline Phosphatase 68 U/L      Total Bilirubin 0.8 mg/dL      Globulin 4.3 gm/dL      A/G Ratio 0.8 g/dL      BUN/Creatinine Ratio 12.4     Anion Gap 12.0 mmol/L      eGFR 45.9 mL/min/1.73     Narrative:      GFR Normal >60  Chronic Kidney Disease <60  Kidney Failure <15      Magnesium [[id number]]  (Normal) Collected: 06/26/24 1112    Specimen: Blood Updated: 06/26/24 1146     Magnesium 1.7 mg/dL     C-reactive Protein [[id number]]  (Abnormal) Collected: 06/26/24 1112    Specimen: Blood Updated: 06/26/24 1146     C-Reactive Protein 23.83 mg/dL     Protime-INR [[id number]]  (Abnormal) Collected: 06/26/24 1112    Specimen: Blood Updated: 06/26/24 1136     Protime 14.8 Seconds      INR 1.12    aPTT [[id number]]  (Normal) Collected: 06/26/24 1112    Specimen: Blood Updated: 06/26/24 1136     PTT 28.9 seconds     Urinalysis, Microscopic Only - Urine, Clean Catch [[id number]]  (Abnormal) Collected: 06/26/24 1114    Specimen: Urine, Clean Catch Updated: 06/26/24 1131     RBC, UA Too Numerous to Count /HPF      WBC, UA Too Numerous to Count /HPF      Bacteria, UA 2+ /HPF      Squamous Epithelial Cells, UA 0-2 /HPF      Hyaline Casts, UA None Seen /LPF      Methodology Automated Microscopy    Urinalysis With Culture If Indicated - Urine, Clean Catch [[id number]]  (Abnormal) Collected: 06/26/24 1114    Specimen: Urine, Clean Catch Updated: 06/26/24 1126     Color, UA Yellow     Appearance, UA Cloudy     pH, UA 6.0     Specific Gravity, UA 1.020     Glucose, UA Negative     Ketones, UA Trace     Bilirubin, UA Negative     Blood, UA Large (3+)     Protein,  mg/dL (2+)     Leuk Esterase, UA Large (3+)     Nitrite, UA Negative     Urobilinogen, UA 2.0 E.U./dL    Narrative:      In absence of clinical symptoms, the presence of pyuria, bacteria, and/or nitrites on the urinalysis result  does not correlate with infection.    CBC & Differential [[id number]]  (Abnormal) Collected: 06/26/24 1112    Specimen: Blood Updated: 06/26/24 1124    Narrative:      The following orders were created for panel order CBC & Differential.  Procedure                               Abnormality         Status                     ---------                               -----------         ------                     CBC Auto Differential[[id number]]        Abnormal            Final result                 Please view results for these tests on the individual orders.    CBC Auto Differential [[id number]]  (Abnormal) Collected: 06/26/24 1112    Specimen: Blood Updated: 06/26/24 1124     WBC 11.33 10*3/mm3      RBC 3.72 10*6/mm3      Hemoglobin 11.9 g/dL      Hematocrit 35.4 %      MCV 95.2 fL      MCH 32.0 pg      MCHC 33.6 g/dL      RDW 13.0 %      RDW-SD 45.7 fl      MPV 9.9 fL      Platelets 233 10*3/mm3      Neutrophil % 76.6 %      Lymphocyte % 8.8 %      Monocyte % 13.4 %      Eosinophil % 0.2 %      Basophil % 0.3 %      Immature Grans % 0.7 %      Neutrophils, Absolute 8.68 10*3/mm3      Lymphocytes, Absolute 1.00 10*3/mm3      Monocytes, Absolute 1.52 10*3/mm3      Eosinophils, Absolute 0.02 10*3/mm3      Basophils, Absolute 0.03 10*3/mm3      Immature Grans, Absolute 0.08 10*3/mm3      nRBC 0.0 /100 WBC           Imaging Results (Last 72 Hours)       Procedure Component Value Units Date/Time    CT Lower Extremity Left With Contrast [[id number]] Collected: 06/26/24 1357     Updated: 06/26/24 1405    Narrative:      CT LOWER EXTREMITY LEFT W CONTRAST- 6/26/2024 11:58 AM     HISTORY: eval for deep space infectino or any necrotizing infection;  E87.3-Tije-iqasykmrax and hyponatremia; N30.90-Cystitis, unspecified  without hematuria      COMPARISON: Plain films 6/26/2024     TOTAL DOSE LENGTH PRODUCT: 459.72 mGy.cm. Automated exposure control was  also utilized to decrease patient radiation dose.     TECHNIQUE: Axial images  of the left lower extremity are obtained from  the level of the proximal tibia fibula through the mid to distal foot  following IV contrast. Sagittal and coronal images reformatted.     FINDINGS: There is near complete circumferential soft tissue thickening  of the distal left lower extremity (sparing only posterior skin and soft  tissues) with underlying subcutaneous edema. There are varicosities  within the superficial tissues of the medial proximal and distal left  lower extremity. There is no subcutaneous emphysema. No loculated soft  tissue abscess collection. Underlying bony structures are preserved  without evidence of osteomyelitis. There are arthritic changes of the  talonavicular joint. Mild to moderate calcaneal spurring.          Impression:      Diffuse soft tissue thickening and underlying edema of the superficial  soft tissues of the distal left lower extremity from the visible  proximal calf through the foot favoring cellulitis. No subcutaneous  emphysema to suggest necrotizing infection. No loculated soft tissue  abscess collection. Varicosities within the medial soft tissues. No  underlying evidence of osteomyelitis.     This report was signed and finalized on 6/26/2024 2:02 PM by Dr. Trina Kennedy MD.       XR Tibia Fibula 2 View Left [[id number]] Collected: 06/26/24 1131     Updated: 06/26/24 1136    Narrative:      EXAMINATION: XR TIBIA FIBULA 2 VW LEFT-  6/26/2024 11:31 AM     HISTORY: Ulcerated wound to anterior shin.     FINDINGS: 2 view exam of the left tibia and fibula demonstrates no  evidence of acute or chronic osteomyelitis. No soft tissue gas is  present. There is soft tissue swelling involving the pretibial soft  tissues along the anterior tibial shaft. Phleboliths are noted within  the left lower extremity. There is mild diffuse soft tissue  swelling/edema.       Impression:      1.. No evidence of fracture or osteomyelitis.  2. Soft tissue swelling/prominence involving the  anterior pretibial soft  tissues.     This report was signed and finalized on 6/26/2024 11:33 AM by Dr. Alexys Boogie MD.                  ASSESSMENT/PLAN       Examination and evaluation of wound(s) was performed.    An excisional tissue debridement was completed of the wound located on the left lower leg with a total area of 207.12 cm². The following instruments were used: Left lower leg. Pain control was achieved using lidocaine 4% topical solution. Material removed includes slough and subcutaneous tissue.  Tissue collected for culture. A minimal amount of bleeding was controlled with pressure. The procedure was tolerated well with a pain level of 0 prior to procedure and a pain level of 0 following the procedure. Post debridement measurements: Left anterior superior lower leg wound measures 11.6 cm x 10.8 cm x 0.1 cm; left anterior distal lower leg wound measures 9.3 cm x 8.8 cm x 0.1 cm. Tissue exposed post debridement is subcutaneous tissue.     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  Dressings applied as ordered below.   Discussed follow-up with patient including referral to wound clinic after discharge along with vascular surgery.  Patient agrees to follow-up.        Start     Ordered    06/27/24 1347  Wound Care  Daily      Question Answer Comment   Wound Locations Left lower leg    Wound Care Instructions Clean with NS.  Apply Opticell Ag to wounds. May apply ABD pad if needed for drainage. [facility]. Change daily.    Cleanse Normal Saline    Intervention Other    Other Opticell Ag    Dressing: Abdominal Pad    Securement Ace Wrap        06/27/24 5777     06/27/24 1347  Elevate Extremity  Continuous        Comments: Elevate LLE above heart level when possible   Question:  Elevate Extremity  Answer:  LLE    06/27/24 1346    06/27/24 0000  Ambulatory Referral to Wound Clinic         06/27/24 1348    06/27/24 0000  Ambulatory Referral to Vascular Surgery         06/27/24 1348   Tissue / Bone Culture - Tissue, Leg, Left [[id number]] (Order [id number])  Order  Date: 6/27/2024 Department: [address] Ordering/Authorizing: Nelly Sosa APRN       Ankle / Brachial Indices Extremity Complete [[id number]] (Order [id number])  Order  Date: 6/27/2024 Department: [address] Ordering/Authorizing: Nelly Sosa APRN              Discussed findings and treatment plan including risks, benefits, and treatment options with patient in detail. Patient agreed with treatment plan.      This document has been electronically signed by ESTEFANI Nelson on 6/27/2024 13:50 CDT

## 2024-06-28 VITALS
OXYGEN SATURATION: 100 % | SYSTOLIC BLOOD PRESSURE: 128 MMHG | BODY MASS INDEX: 42.66 KG/M2 | WEIGHT: 315 LBS | RESPIRATION RATE: 18 BRPM | DIASTOLIC BLOOD PRESSURE: 78 MMHG | TEMPERATURE: 97.4 F | HEIGHT: 72 IN | HEART RATE: 82 BPM

## 2024-06-28 LAB
ALBUMIN SERPL-MCNC: 3 G/DL (ref 3.5–5.2)
ALBUMIN/GLOB SERPL: 0.8 G/DL
ALP SERPL-CCNC: 61 U/L (ref 39–117)
ALT SERPL W P-5'-P-CCNC: 11 U/L (ref 1–41)
ANION GAP SERPL CALCULATED.3IONS-SCNC: 8 MMOL/L (ref 5–15)
AST SERPL-CCNC: 10 U/L (ref 1–40)
BACTERIA SPEC AEROBE CULT: ABNORMAL
BASOPHILS # BLD AUTO: 0.03 10*3/MM3 (ref 0–0.2)
BASOPHILS NFR BLD AUTO: 0.5 % (ref 0–1.5)
BILIRUB SERPL-MCNC: 0.2 MG/DL (ref 0–1.2)
BUN SERPL-MCNC: 21 MG/DL (ref 6–20)
BUN/CREAT SERPL: 13.8 (ref 7–25)
CALCIUM SPEC-SCNC: 8.7 MG/DL (ref 8.6–10.5)
CHLORIDE SERPL-SCNC: 102 MMOL/L (ref 98–107)
CO2 SERPL-SCNC: 23 MMOL/L (ref 22–29)
CREAT SERPL-MCNC: 1.52 MG/DL (ref 0.76–1.27)
DEPRECATED RDW RBC AUTO: 46.7 FL (ref 37–54)
EGFRCR SERPLBLD CKD-EPI 2021: 52.5 ML/MIN/1.73
EOSINOPHIL # BLD AUTO: 0.11 10*3/MM3 (ref 0–0.4)
EOSINOPHIL NFR BLD AUTO: 1.8 % (ref 0.3–6.2)
ERYTHROCYTE [DISTWIDTH] IN BLOOD BY AUTOMATED COUNT: 13.1 % (ref 12.3–15.4)
GLOBULIN UR ELPH-MCNC: 3.8 GM/DL
GLUCOSE SERPL-MCNC: 113 MG/DL (ref 65–99)
HCT VFR BLD AUTO: 33.3 % (ref 37.5–51)
HGB BLD-MCNC: 11 G/DL (ref 13–17.7)
IMM GRANULOCYTES # BLD AUTO: 0.07 10*3/MM3 (ref 0–0.05)
IMM GRANULOCYTES NFR BLD AUTO: 1.1 % (ref 0–0.5)
LYMPHOCYTES # BLD AUTO: 1.44 10*3/MM3 (ref 0.7–3.1)
LYMPHOCYTES NFR BLD AUTO: 23.3 % (ref 19.6–45.3)
MCH RBC QN AUTO: 32 PG (ref 26.6–33)
MCHC RBC AUTO-ENTMCNC: 33 G/DL (ref 31.5–35.7)
MCV RBC AUTO: 96.8 FL (ref 79–97)
MONOCYTES # BLD AUTO: 0.76 10*3/MM3 (ref 0.1–0.9)
MONOCYTES NFR BLD AUTO: 12.3 % (ref 5–12)
NEUTROPHILS NFR BLD AUTO: 3.76 10*3/MM3 (ref 1.7–7)
NEUTROPHILS NFR BLD AUTO: 61 % (ref 42.7–76)
NRBC BLD AUTO-RTO: 0 /100 WBC (ref 0–0.2)
PLATELET # BLD AUTO: 245 10*3/MM3 (ref 140–450)
PMV BLD AUTO: 10.8 FL (ref 6–12)
POTASSIUM SERPL-SCNC: 4.3 MMOL/L (ref 3.5–5.2)
PROT SERPL-MCNC: 6.8 G/DL (ref 6–8.5)
RBC # BLD AUTO: 3.44 10*6/MM3 (ref 4.14–5.8)
SODIUM SERPL-SCNC: 133 MMOL/L (ref 136–145)
WBC NRBC COR # BLD AUTO: 6.17 10*3/MM3 (ref 3.4–10.8)

## 2024-06-28 PROCEDURE — 85025 COMPLETE CBC W/AUTO DIFF WBC: CPT | Performed by: NURSE PRACTITIONER

## 2024-06-28 PROCEDURE — 80053 COMPREHEN METABOLIC PANEL: CPT | Performed by: NURSE PRACTITIONER

## 2024-06-28 PROCEDURE — 25010000002 ENOXAPARIN PER 10 MG: Performed by: INTERNAL MEDICINE

## 2024-06-28 RX ORDER — LEVOFLOXACIN 750 MG/1
750 TABLET, FILM COATED ORAL EVERY 24 HOURS
Status: DISCONTINUED | OUTPATIENT
Start: 2024-06-28 | End: 2024-06-28 | Stop reason: HOSPADM

## 2024-06-28 RX ORDER — LEVOFLOXACIN 750 MG/1
750 TABLET, FILM COATED ORAL EVERY 24 HOURS
Qty: 6 TABLET | Refills: 0 | Status: SHIPPED | OUTPATIENT
Start: 2024-06-28 | End: 2024-07-05

## 2024-06-28 RX ORDER — DOXYCYCLINE 100 MG/1
100 CAPSULE ORAL EVERY 12 HOURS SCHEDULED
Qty: 14 CAPSULE | Refills: 0 | Status: SHIPPED | OUTPATIENT
Start: 2024-06-28 | End: 2024-07-05

## 2024-06-28 RX ORDER — PHENAZOPYRIDINE HYDROCHLORIDE 200 MG/1
200 TABLET, FILM COATED ORAL
Qty: 1 TABLET | Refills: 0 | Status: SHIPPED | OUTPATIENT
Start: 2024-06-28 | End: 2024-06-29

## 2024-06-28 RX ORDER — DOXYCYCLINE 100 MG/1
100 TABLET ORAL EVERY 12 HOURS SCHEDULED
Status: DISCONTINUED | OUTPATIENT
Start: 2024-06-28 | End: 2024-06-28 | Stop reason: HOSPADM

## 2024-06-28 RX ADMIN — ENOXAPARIN SODIUM 40 MG: 100 INJECTION SUBCUTANEOUS at 10:01

## 2024-06-28 RX ADMIN — Medication 10 ML: at 10:02

## 2024-06-28 RX ADMIN — LISINOPRIL 10 MG: 10 TABLET ORAL at 10:01

## 2024-06-28 RX ADMIN — PHENAZOPYRIDINE HYDROCHLORIDE 200 MG: 200 TABLET ORAL at 13:11

## 2024-06-28 RX ADMIN — DOXYCYCLINE 100 MG: 100 TABLET, FILM COATED ORAL at 13:11

## 2024-06-28 RX ADMIN — PHENAZOPYRIDINE HYDROCHLORIDE 200 MG: 200 TABLET ORAL at 10:01

## 2024-06-28 RX ADMIN — LEVOFLOXACIN 750 MG: 750 TABLET, FILM COATED ORAL at 13:11

## 2024-06-28 NOTE — PLAN OF CARE
Goal Outcome Evaluation:  Plan of Care Reviewed With: patient        Progress: improving  Outcome Evaluation: Patient A/O x 4. VSS. On RA. Tolerating IV fluids and IV ABT. Voiding per bathroom. Wound care orders in place. Pain med given once last night with good results. Safety maintained.

## 2024-06-28 NOTE — PLAN OF CARE
Goal Outcome Evaluation:  Plan of Care Reviewed With: patient        Progress: no change     Pt A/O x4. VSS. No c/o pain this shift. Wound care done per order. Wound care education completed with pt. Call light within reach. Safety maintained. Plan of care ongoing. No changes this shift.

## 2024-06-28 NOTE — DISCHARGE SUMMARY
[facility] Medicine Services  DISCHARGE SUMMARY     Date of Admission: 6/26/2024  Date of Discharge:  6/28/2024  Primary Care Physician: Omid Sneed MD    Presenting Problem/History of Present Illness:  Pain with urination, left lower extremity wounds    Final Discharge Diagnoses:  Active Hospital Problems    Diagnosis     **Leg ulcer, left with cellulitis     Primary hypertension     Stage 3a chronic kidney disease     Left lower extremity wound infection Pseudomonas aeruginosa and MRSA     Acute cystitis, acute UTI, Proteus mirabilis     Morbid obesity     Hyponatremia     Normocytic anemia        Consults:   Dr. Garcia, general surgery  [facility] wound care    Procedures Performed: Bedside debridement left lower extremity wounds 6/27/2024 [facility] wound care    Pertinent Test Results:       Imaging Results (All)       Procedure Component Value Units Date/Time    CT Lower Extremity Left With Contrast [[id number]] Collected: 06/26/24 1357     Updated: 06/26/24 1405    Narrative:      CT LOWER EXTREMITY LEFT W CONTRAST- 6/26/2024 11:58 AM     HISTORY: eval for deep space infectino or any necrotizing infection;  E87.4-Liro-qvwnlbdtxr and hyponatremia; N30.90-Cystitis, unspecified  without hematuria      COMPARISON: Plain films 6/26/2024     TOTAL DOSE LENGTH PRODUCT: 459.72 mGy.cm. Automated exposure control was  also utilized to decrease patient radiation dose.     TECHNIQUE: Axial images of the left lower extremity are obtained from  the level of the proximal tibia fibula through the mid to distal foot  following IV contrast. Sagittal and coronal images reformatted.     FINDINGS: There is near complete circumferential soft tissue thickening  of the distal left lower extremity (sparing only posterior skin and soft  tissues) with underlying subcutaneous edema. There are varicosities  within the superficial tissues of the medial proximal and distal  left  lower extremity. There is no subcutaneous emphysema. No loculated soft  tissue abscess collection. Underlying bony structures are preserved  without evidence of osteomyelitis. There are arthritic changes of the  talonavicular joint. Mild to moderate calcaneal spurring.          Impression:      Diffuse soft tissue thickening and underlying edema of the superficial  soft tissues of the distal left lower extremity from the visible  proximal calf through the foot favoring cellulitis. No subcutaneous  emphysema to suggest necrotizing infection. No loculated soft tissue  abscess collection. Varicosities within the medial soft tissues. No  underlying evidence of osteomyelitis.     This report was signed and finalized on 6/26/2024 2:02 PM by Dr. Trina Kennedy MD.       XR Tibia Fibula 2 View Left [[id number]] Collected: 06/26/24 1131     Updated: 06/26/24 1136    Narrative:      EXAMINATION: XR TIBIA FIBULA 2 VW LEFT-  6/26/2024 11:31 AM     HISTORY: Ulcerated wound to anterior shin.     FINDINGS: 2 view exam of the left tibia and fibula demonstrates no  evidence of acute or chronic osteomyelitis. No soft tissue gas is  present. There is soft tissue swelling involving the pretibial soft  tissues along the anterior tibial shaft. Phleboliths are noted within  the left lower extremity. There is mild diffuse soft tissue  swelling/edema.       Impression:      1.. No evidence of fracture or osteomyelitis.  2. Soft tissue swelling/prominence involving the anterior pretibial soft  tissues.     This report was signed and finalized on 6/26/2024 11:33 AM by Dr. Alexys Boogie MD.             LAB RESULTS:      Lab 06/28/24  0831 06/27/24  0540 06/26/24  1250 06/26/24  1112   WBC 6.17 7.89  --  11.33*   HEMOGLOBIN 11.0* 10.5*  --  11.9*   HEMATOCRIT 33.3* 31.7*  --  35.4*   PLATELETS 245 233  --  233   NEUTROS ABS 3.76  --   --  8.68*   IMMATURE GRANS (ABS) 0.07*  --   --  0.08*   LYMPHS ABS 1.44  --   --  1.00   MONOS ABS  0.76  --   --  1.52*   EOS ABS 0.11  --   --  0.02   MCV 96.8 95.5  --  95.2   CRP  --   --   --  23.83*   LACTATE  --   --  1.2  --    PROTIME  --   --   --  14.8   APTT  --   --   --  28.9         Lab 06/28/24  0546 06/27/24  0540 06/26/24  1112   SODIUM 133* 132* 127*   POTASSIUM 4.3 4.0 4.1   CHLORIDE 102 103 96*   CO2 23.0 20.0* 19.0*   ANION GAP 8.0 9.0 12.0   BUN 21* 22* 21*   CREATININE 1.52* 1.59* 1.70*   EGFR 52.5* 49.7* 45.9*   GLUCOSE 113* 107* 123*   CALCIUM 8.7 8.4* 8.7   MAGNESIUM  --   --  1.7   HEMOGLOBIN A1C  --   --  6.20*         Lab 06/28/24  0546 06/26/24  1112   TOTAL PROTEIN 6.8 7.6   ALBUMIN 3.0* 3.3*   GLOBULIN 3.8 4.3   ALT (SGPT) 11 10   AST (SGOT) 10 10   BILIRUBIN 0.2 0.8   ALK PHOS 61 68         Lab 06/26/24  1112   PROTIME 14.8   INR 1.12*                 Brief Urine Lab Results  (Last result in the past 365 days)        Color   Clarity   Blood   Leuk Est   Nitrite   Protein   CREAT   Urine HCG        06/26/24 1114 Yellow   Cloudy   Large (3+)   Large (3+)   Negative   100 mg/dL (2+)                 Microbiology Results (last 10 days)       Procedure Component Value - Date/Time    Tissue / Bone Culture - Tissue, Leg, Left [[id number]] Collected: 06/27/24 1347    Lab Status: Preliminary result Specimen: Tissue from Leg, Left Updated: 06/28/24 0909     Tissue Culture Culture in progress     Gram Stain No WBCs or organisms seen    MRSA Screen, PCR (Inpatient) - Swab, Nares [[id number]]  (Normal) Collected: 06/26/24 2319    Lab Status: Final result Specimen: Swab from Nares Updated: 06/27/24 0040     MRSA PCR No MRSA Detected    Narrative:      The negative predictive value of this diagnostic test is high and should only be used to consider de-escalating anti-MRSA therapy. A positive result may indicate colonization with MRSA and must be correlated clinically.    Blood Culture - Blood, Arm, Left [[id number]]  (Normal) Collected: 06/26/24 1115    Lab Status: Preliminary result Specimen: Blood  from Arm, Left Updated: 06/28/24 1201     Blood Culture No growth at 2 days    Wound Culture - Wound, Leg, Left [[id number]]  (Abnormal)  (Susceptibility) Collected: 06/26/24 1115    Lab Status: Preliminary result Specimen: Wound from Leg, Left Updated: 06/28/24 0929     Wound Culture Light growth (2+) Pseudomonas aeruginosa      Scant growth (1+) Staphylococcus aureus, MRSA     Comment:   Methicillin resistant Staphylococcus aureus, Patient may be an isolation risk.        Gram Stain Rare (1+) Gram positive cocci    Susceptibility        Pseudomonas aeruginosa      ZOHRA      Cefepime Susceptible      Ceftazidime Susceptible      Ciprofloxacin Susceptible      Levofloxacin Susceptible      Piperacillin + Tazobactam Susceptible      Tobramycin Susceptible                       Susceptibility Comments       Pseudomonas aeruginosa    With the exception of urinary-sourced infections, aminoglycosides should not be used as monotherapy.               Urine Culture - Urine, Urine, Clean Catch [[id number]]  (Abnormal)  (Susceptibility) Collected: 06/26/24 1114    Lab Status: Final result Specimen: Urine, Clean Catch Updated: 06/28/24 0956     Urine Culture >100,000 CFU/mL Proteus mirabilis    Narrative:      Colonization of the urinary tract without infection is common. Treatment is discouraged unless the patient is symptomatic, pregnant, or undergoing an invasive urologic procedure.    Susceptibility        Proteus mirabilis      ZOHRA      Amoxicillin + Clavulanate Susceptible      Ampicillin Susceptible      Ampicillin + Sulbactam Susceptible      Cefazolin Susceptible      Cefepime Susceptible      Ceftazidime Susceptible      Ceftriaxone Susceptible      Gentamicin Susceptible      Levofloxacin Susceptible      Nitrofurantoin Resistant      Piperacillin + Tazobactam Susceptible      Trimethoprim + Sulfamethoxazole Susceptible                           Blood Culture - Blood, Hand, Digit Right [[id number]]  (Normal) Collected:  06/26/24 1112    Lab Status: Preliminary result Specimen: Blood from Hand, Digit Right Updated: 06/28/24 1201     Blood Culture No growth at 2 days            Hospital Course: Kirt Ojeda presented to [facility] emergency room 6/26/2024 with painful urination and left lower extremity wound.  Patient reports for the last couple of weeks pain with urination, dark concentrated urine and malodorous urine.  Patient denied fever but did describe episodes of chills alternating with sweats.  Patient reported trying to drink more fluids and water without improvement of symptoms.  He denies previous history of bladder infection, flank pain, nausea or vomiting.  Patient does report chronic wound left lower extremity looking worse over the last few weeks.  He denied any purulent discharge or any new injury.  Patient denies previous history of staph or MRSA.  Patient reported 2 of the sites were getting bigger over the last 1 to 2 weeks.  He has a history of hypertension on lisinopril and is followed by Dr. Sneed.     He was admitted to the medical floor with nonhealing  leg ulcers left lower extremity with cellulitis.  3 chronic nonhealing ulcers noted left lower extremity.  Patient has not followed with wound care. Cefepime given in ER. General surgery consulted and recommended Xeroform and dry wrap.  CT left lower extremity notes diffuse soft tissue thickening and underlying edema of the superficial soft tissue distal left lower extremity from the visible proximal calf through the foot favoring cellulitis.  No subcu emphysema to suggest necrotizing infection.  No loculated soft tissue abscess collection.  Varicosities.  No underlying evidence of osteomyelitis.  Wound care consulted and he was seen by Nelly JARA who debrided areas at bedside.  Recommendations to clean with normal saline and apply Opticell AG to wound beds and ABD pad daily.  Secure with Kerlix.  Patient will follow-up in wound care clinic  on 7/3/2024.      Cefepime given in ER and patient was started on Rocephin and vancomycin.  MRSA nasal screen negative.  Wound culture 6/26 on admission positive for Pseudomonas aeruginosa and Staphylococcus aureus MRSA.  Antibiotics transitioned to Levaquin 750 orally daily for 7 days and doxycycline 100 mg twice daily for 7 days.  Patient was instructed to avoid sun exposure while taking doxycycline.  WBC 11.33 on admission trended down to 6.17 at discharge.  CRP 23.83 on admission.  Patient remained afebrile.    Urinalysis too numerous to count WBC, 2+ bacteria.  Rocephin ordered on admission and Pyridium added for dysuria.  Urine culture positive for Proteus mirabilis and antibiotics transitioned to Levaquin which also covered Pseudomonas leg wound culture.    Sodium 127 on admission, clinically significant.  IV fluids given and sodium improved to 132 and remained stable at 133 at discharge.    Suspect patient has chronic kidney disease stage IIIa with unknown baseline creatinine.  Creatinine 1.7 on admission and patient was hydrated with IV fluids.  Creatinine remained stable at 1.52 at discharge.  Recommend basic metabolic panel with follow-up appoint with Dr. Sneed on 7/8/2024.    Patient with normocytic anemia with hemoglobin 11.9 on admission.  Patient received IV fluids.  Hemoglobin stable 11 at discharge.  Patient denies any bright red bleeding per rectum or dark tarry stools.    Lisinopril continued for primary hypertension.  Blood pressure 128/78.    Lovenox ordered for deep vein thrombosis prophylaxis.  Patient ambulatory per self.    On 6/28/2024, he is stable for discharge.  Left lower extremity wounds debrided per wound care and patient will continue with daily wound care with [facility] and follow-up in wound care clinic on 7/3/24.  Antibiotics transitioned to Levaquin and doxycycline for Pseudomonas aeruginosa and MRSA leg wound and Proteus mirabilis urine culture.  Patient will follow-up with  "Dr. Sneed on 7/8/2024 with basic metabolic panel.  Referral made to Dr. Raygoza, vascular surgery.      Physical Exam on Discharge:  /78 (BP Location: Right arm, Patient Position: Lying)   Pulse 82   Temp 97.4 °F (36.3 °C) (Axillary)   Resp 18   Ht 182.9 cm (72\")   Wt (!) 164 kg (361 lb)   SpO2 100%   BMI 48.96 kg/m²   Physical Exam  Vitals and nursing note reviewed.   Constitutional:       Appearance: He is obese.      Comments: Up in room.  No oxygen in use.  No visitors in room.   HENT:      Head: Normocephalic and atraumatic.      Nose: No congestion.      Mouth/Throat:      Pharynx: Oropharynx is clear. No oropharyngeal exudate or posterior oropharyngeal erythema.   Eyes:      Extraocular Movements: Extraocular movements intact.      Pupils: Pupils are equal, round, and reactive to light.   Cardiovascular:      Rate and Rhythm: Normal rate and regular rhythm.      Heart sounds: No murmur heard.  Pulmonary:      Breath sounds: No wheezing, rhonchi or rales.      Comments: No oxygen in use.  Abdominal:      Palpations: Abdomen is soft.      Tenderness: There is no abdominal tenderness.   Genitourinary:     Comments: Voiding.  Musculoskeletal:         General: Tenderness (Left lower extremity.) present.      Cervical back: Normal range of motion and neck supple.   Skin:     Comments: : 3 open areas left lower extremity 5 cm x 9.5 cm, 7 cm x 7 cm, 3 cm x 5 cm.  Area is open without drainage.  Opticell AG to wound beds.  Kerlix dressing.   Neurological:      General: No focal deficit present.      Mental Status: He is alert and oriented to person, place, and time.   Psychiatric:         Mood and Affect: Mood normal.         Behavior: Behavior normal.         Thought Content: Thought content normal.         Judgment: Judgment normal.         Condition on Discharge: Stable for discharge home    Discharge Disposition:  Home or Self Care    Discharge Medications:     Discharge Medications        New " Medications        Instructions Start Date   doxycycline 100 MG capsule  Commonly known as: MONODOX   100 mg, Oral, Every 12 Hours Scheduled      levoFLOXacin 750 MG tablet  Commonly known as: LEVAQUIN   750 mg, Oral, Every 24 Hours      phenazopyridine 200 MG tablet  Commonly known as: PYRIDIUM   200 mg, Oral, 3 Times Daily With Meals             Continue These Medications        Instructions Start Date   lisinopril 10 MG tablet  Commonly known as: PRINIVIL,ZESTRIL   10 mg, Oral, Daily               Discharge Diet:   Diet Instructions       Diet: Regular/House Diet; Regular (IDDSI 7); Thin (IDDSI 0)      Discharge Diet: Regular/House Diet    Texture: Regular (IDDSI 7)    Fluid Consistency: Thin (IDDSI 0)            Activity at Discharge:   Activity Instructions       Activity as Tolerated              Discharge instructions:  1.  For worsening dysuria, drainage to left lower extremity seek medical attention.  2.  Levaquin 750 mg orally daily for 7 days for Proteus mirabilis UTI and Pseudomonas aeruginosa left leg culture  3.  Doxycycline 100 mg twice daily for 7 days  4.  Avoid sun exposure while taking doxycycline  5.  Clean left lower extremity with normal saline and apply Opticell AG to wounds daily.  6.  Follow-up with wound care on 7/3/2024  7.  Follow-up with Dr. Sneed 7/8/2024 with basic metabolic panel.  8.  Referral to Dr. Raygoza, vascular surgery      Follow-up Appointments:   Future Appointments   Date Time Provider Department Center   7/3/2024 10:30 AM Tracy Barrios APRN  [facility]       Test Results Pending at Discharge: None    Electronically signed by ESTEFANI Jacobs, 06/28/24, 16:12 CDT.    Time: 35 minutes.  Discussed with Dr. Galvez and patient.    The above documentation resulted from a face-to-face encounter by me Jolanta JARA, [facility].

## 2024-06-29 ENCOUNTER — READMISSION MANAGEMENT (OUTPATIENT)
Dept: CALL CENTER | Facility: HOSPITAL | Age: 59
End: 2024-06-29
Payer: MEDICARE

## 2024-06-29 LAB
BACTERIA SPEC AEROBE CULT: ABNORMAL
GRAM STN SPEC: ABNORMAL
GRAM STN SPEC: ABNORMAL

## 2024-06-29 NOTE — OUTREACH NOTE
Prep Survey      Flowsheet Row Responses   [facility] facility patient discharged from? [facility]   Is LACE score < 7 ? No   Eligibility Readm Mgmt   Discharge diagnosis Leg ulcer, left with cellulitis   Does the patient have one of the following disease processes/diagnoses(primary or secondary)? Other   Does the patient have Home health ordered? No   Is there a DME ordered? No   Prep survey completed? Yes            Sirisha MARIANO - Registered Nurse

## 2024-07-01 LAB
BACTERIA SPEC AEROBE CULT: NORMAL
BACTERIA SPEC AEROBE CULT: NORMAL

## 2024-07-03 ENCOUNTER — OFFICE VISIT (OUTPATIENT)
Dept: WOUND CARE | Facility: HOSPITAL | Age: 59
End: 2024-07-03
Payer: MEDICARE

## 2024-07-03 PROCEDURE — G0463 HOSPITAL OUTPT CLINIC VISIT: HCPCS

## 2024-07-05 ENCOUNTER — TRANSCRIBE ORDERS (OUTPATIENT)
Dept: ADMINISTRATIVE | Facility: HOSPITAL | Age: 59
End: 2024-07-05
Payer: MEDICARE

## 2024-07-05 DIAGNOSIS — R60.0 LOCALIZED EDEMA: Primary | ICD-10-CM

## 2024-07-09 ENCOUNTER — TELEPHONE (OUTPATIENT)
Dept: VASCULAR SURGERY | Facility: CLINIC | Age: 59
End: 2024-07-09
Payer: MEDICARE

## 2024-07-10 ENCOUNTER — OFFICE VISIT (OUTPATIENT)
Dept: VASCULAR SURGERY | Facility: CLINIC | Age: 59
End: 2024-07-10
Payer: MEDICARE

## 2024-07-10 ENCOUNTER — HOSPITAL ENCOUNTER (OUTPATIENT)
Dept: ULTRASOUND IMAGING | Facility: HOSPITAL | Age: 59
Discharge: HOME | End: 2024-07-10
Payer: MEDICARE

## 2024-07-10 ENCOUNTER — OFFICE VISIT (OUTPATIENT)
Dept: WOUND CARE | Facility: HOSPITAL | Age: 59
End: 2024-07-10
Payer: MEDICARE

## 2024-07-10 VITALS
SYSTOLIC BLOOD PRESSURE: 144 MMHG | WEIGHT: 315 LBS | BODY MASS INDEX: 42.66 KG/M2 | HEART RATE: 105 BPM | DIASTOLIC BLOOD PRESSURE: 82 MMHG | OXYGEN SATURATION: 95 % | HEIGHT: 72 IN

## 2024-07-10 DIAGNOSIS — Z01.818 PREOP TESTING: ICD-10-CM

## 2024-07-10 DIAGNOSIS — I10 PRIMARY HYPERTENSION: ICD-10-CM

## 2024-07-10 DIAGNOSIS — I87.332 CHRONIC VENOUS HYPERTENSION WITH ULCER AND INFLAMMATION INVOLVING LEFT SIDE: Primary | ICD-10-CM

## 2024-07-10 DIAGNOSIS — R60.0 LOCALIZED EDEMA: ICD-10-CM

## 2024-07-10 PROCEDURE — 93970 EXTREMITY STUDY: CPT

## 2024-07-10 RX ORDER — LISINOPRIL 10 MG/1
10 TABLET ORAL DAILY
Qty: 30 TABLET | Refills: 0 | Status: SHIPPED | OUTPATIENT
Start: 2024-07-10

## 2024-07-10 NOTE — PROGRESS NOTES
07/10/2024      Nelly Sosa, APRN  [address] [facility] [address]  [address]  [place] [address]    Kirt Ojeda  1965    Chief Complaint   Patient presents with    NEW PATIENT     Referred from Nelly Sosa for pressure ulcer of left leg. Patient states that it has been present for 6 months. Has worn compression but not helping. Patient does not smoke, has been chewing for 40 years. Popped main artery in shin at work, set up gangrene.        Dear ESTEFANI Nelson:      HPI  I had the pleasure of seeing your patient Kirt Ojeda in the office today.  Thank you kindly for this consultation.  As you recall, Kirt Ojeda is a 59 y.o.  male who you are currently following for wound to left lower extremity.  He is referred to us due to a wound that has been present for 6 months.  He has worn compression stockings but does not seem to be helping.  He was recently admitted to the hospital at the end of June due to this wound, it was cultured and it was positive for Pseudomonas aeruginosa and Staphylococcus aureus, MRSA.  He was treated with Levaquin and doxycycline.  CT of the left lower extremity was negative for osteomyelitis, soft tissue abscess collection, or necrotizing infection.  He reports that he had necrotizing fasciitis to the left lower extremity 30 years ago and has had problems with wounds off-and-on ever since.  He denies symptoms of claudication or ischemia, although he does state on occasion he gets cramping with walking long distances and sometimes pain in his groin area.  He is morbidly obese.  He denies any strokelike symptoms.  He is unaware of a family history of AAA.  He takes 1 medication for blood pressure and has been out for 3 months.  He states that he is considering finding a new PCP but has not done so due to laziness.        Review of Systems   Constitutional: Negative.  Negative for diaphoresis and fever.   HENT: Negative.     Eyes: Negative.    Respiratory: Negative.  Negative for  "shortness of breath and wheezing.    Cardiovascular: Negative.  Positive for leg swelling. Negative for chest pain.   Gastrointestinal: Negative.  Negative for abdominal pain.   Endocrine: Negative.    Genitourinary: Negative.    Musculoskeletal: Negative.    Skin: Negative.  Positive for wound.   Allergic/Immunologic: Negative.    Neurological: Negative.  Negative for dizziness and weakness.   Hematological: Negative.    Psychiatric/Behavioral: Negative.         /82   Pulse 105   Ht 182.9 cm (72\")   Wt (!) 162 kg (357 lb)   SpO2 95%   BMI 48.42 kg/m²       Physical Exam  Vitals and nursing note reviewed.   Constitutional:       General: He is not in acute distress.     Appearance: Normal appearance. He is morbidly obese. He is not diaphoretic.   HENT:      Head: Normocephalic. No right periorbital erythema or left periorbital erythema.      Nose: Nose normal.   Eyes:      General: No scleral icterus.     Pupils: Pupils are equal.   Cardiovascular:      Rate and Rhythm: Normal rate and regular rhythm.      Pulses: Normal pulses.           Femoral pulses are 2+ on the right side and 2+ on the left side.       Popliteal pulses are 2+ on the right side and 2+ on the left side.        Dorsalis pedis pulses are 2+ on the right side and 2+ on the left side.        Posterior tibial pulses are 2+ on the right side and 2+ on the left side.      Heart sounds: Normal heart sounds. No murmur heard.     Comments: Spider veins to bilateral lower feet  Pulmonary:      Effort: Pulmonary effort is normal. No respiratory distress.      Breath sounds: Normal breath sounds.   Abdominal:      General: Bowel sounds are normal. There is no distension.      Palpations: Abdomen is soft.      Tenderness: There is no abdominal tenderness. There is no guarding.   Musculoskeletal:         General: No swelling or tenderness. Normal range of motion.      Cervical back: Normal range of motion and neck supple.      Right lower le+ " Edema present.      Left lower le+ Edema present.   Feet:      Right foot:      Skin integrity: Skin integrity normal.      Left foot:      Skin integrity: Skin integrity normal.   Skin:     General: Skin is warm and dry.      Findings: No erythema or rash.   Neurological:      General: No focal deficit present.      Mental Status: He is alert and oriented to person, place, and time. Mental status is at baseline.      Cranial Nerves: No cranial nerve deficit.      Gait: Gait normal.   Psychiatric:         Attention and Perception: Attention normal.         Mood and Affect: Mood normal.         Behavior: Behavior normal.         Thought Content: Thought content normal.         Judgment: Judgment normal.         DIAGNOSTIC DATA   CT Lower Extremity Left With Contrast    Result Date: 2024  Narrative: CT LOWER EXTREMITY LEFT W CONTRAST- 2024 11:58 AM  HISTORY: eval for deep space infectino or any necrotizing infection; E87.0-Wbpg-xfutxdsmnv and hyponatremia; N30.90-Cystitis, unspecified without hematuria  COMPARISON: Plain films 2024  TOTAL DOSE LENGTH PRODUCT: 459.72 mGy.cm. Automated exposure control was also utilized to decrease patient radiation dose.  TECHNIQUE: Axial images of the left lower extremity are obtained from the level of the proximal tibia fibula through the mid to distal foot following IV contrast. Sagittal and coronal images reformatted.  FINDINGS: There is near complete circumferential soft tissue thickening of the distal left lower extremity (sparing only posterior skin and soft tissues) with underlying subcutaneous edema. There are varicosities within the superficial tissues of the medial proximal and distal left lower extremity. There is no subcutaneous emphysema. No loculated soft tissue abscess collection. Underlying bony structures are preserved without evidence of osteomyelitis. There are arthritic changes of the talonavicular joint. Mild to moderate calcaneal spurring.        Impression: Diffuse soft tissue thickening and underlying edema of the superficial soft tissues of the distal left lower extremity from the visible proximal calf through the foot favoring cellulitis. No subcutaneous emphysema to suggest necrotizing infection. No loculated soft tissue abscess collection. Varicosities within the medial soft tissues. No underlying evidence of osteomyelitis.  This report was signed and finalized on 6/26/2024 2:02 PM by Dr. Trina Kennedy MD.      XR Tibia Fibula 2 View Left    Result Date: 6/26/2024  Narrative: EXAMINATION: XR TIBIA FIBULA 2 VW LEFT-  6/26/2024 11:31 AM  HISTORY: Ulcerated wound to anterior shin.  FINDINGS: 2 view exam of the left tibia and fibula demonstrates no evidence of acute or chronic osteomyelitis. No soft tissue gas is present. There is soft tissue swelling involving the pretibial soft tissues along the anterior tibial shaft. Phleboliths are noted within the left lower extremity. There is mild diffuse soft tissue swelling/edema.      Impression: 1.. No evidence of fracture or osteomyelitis. 2. Soft tissue swelling/prominence involving the anterior pretibial soft tissues.  This report was signed and finalized on 6/26/2024 11:33 AM by Dr. Alexys Boogie MD.         Patient Active Problem List   Diagnosis    Left lower extremity wound infection Pseudomonas aeruginosa and MRSA    Acute cystitis, acute UTI, Proteus mirabilis    Morbid obesity    Hyponatremia    Normocytic anemia    Leg ulcer, left with cellulitis    Primary hypertension    Stage 3a chronic kidney disease         ICD-10-CM ICD-9-CM   1. Chronic venous hypertension with ulcer and inflammation involving left side  I87.332 459.33   2. Primary hypertension  I10 401.9           Plan: After thoroughly evaluating Kirt Ojeda, I believe the best course of action is to proceed with left lower extremity radiofrequency ablation.  Risks of radiofrequency ablation include, but are not limited to, bleeding,  infection, vessel damage, nerve damage, DVT, phlebitis, and pulmonary embolus.  The patient understands these risks and wishes to proceed with procedure. He has a large wound to his anterior left lower extremity, being followed by wound care.  After reviewing his venous duplex he has significant venous insufficiency to his left lower extremity making him a good candidate for radiofrequency ablation.  He also has venous insufficiency to his right lower extremity, although that leg does not give him problems.  He denies heaviness or achiness at rest, it swells minimally.  He has previously worn compression stockings daily without improvement.  I would like him to continue wearing compression stocking to his right lower extremity, elevating his legs when not on them.  He has been out of his blood pressure medication for 3 months and his blood pressure is elevated at 144/82, I have given him a prescription for lisinopril 10 mg once daily.  He will need to find a new PCP.  The patient can continue taking their current medication regimen as previously planned.  This was all discussed in full with complete understanding.    Thank you for allowing me to participate in the care of your patient.  Please do not hesitate with any questions or concerns.  I will keep you aware of any further encounters with Kirt Ojeda.        Sincerely yours,         ESTEFANI Lopez

## 2024-07-11 ENCOUNTER — READMISSION MANAGEMENT (OUTPATIENT)
Dept: CALL CENTER | Facility: HOSPITAL | Age: 59
End: 2024-07-11
Payer: MEDICARE

## 2024-07-11 NOTE — OUTREACH NOTE
Medical Week 2 Survey      Flowsheet Row Responses   [facility] patient discharged from? [facility]   Does the patient have one of the following disease processes/diagnoses(primary or secondary)? Other   Week 2 attempt successful? Yes   Call start time 1319   Discharge diagnosis Leg ulcer, left with cellulitis   Call end time 1325   Meds reviewed with patient/caregiver? Yes   Is the patient having any side effects they believe may be caused by any medication additions or changes? No   Does the patient have all medications ordered at discharge? Yes   Is the patient taking all medications as directed (includes completed medication regime)? Yes   Comments regarding appointments wound care weekly   Does the patient have a primary care provider?  Yes   Does the patient have an appointment with their PCP within 7 days of discharge? Greater than 7 days   Comments regarding PCP 7/18/24   Nursing Interventions Verified appointment date/time/provider   Has the patient kept scheduled appointments due by today? Yes   Has home health visited the patient within 72 hours of discharge? N/A   DME comments Pt wears a unna boot   Psychosocial issues? No   Did the patient receive a copy of their discharge instructions? Yes   Nursing interventions Reviewed instructions with patient   What is the patient's perception of their health status since discharge? Improving   Is the patient/caregiver able to teach back the hierarchy of who to call/visit for symptoms/problems? PCP, Specialist, Home health nurse, Urgent Care, ED, 911 Yes   Week 2 Call Completed? Yes   Is the patient interested in additional calls from an ambulatory ? No   Would this patient benefit from a Referral to Amb Social Work? No   Call end time 1325            Maribell MARIANO - Registered Nurse

## 2024-07-15 ENCOUNTER — TELEPHONE (OUTPATIENT)
Dept: VASCULAR SURGERY | Facility: CLINIC | Age: 59
End: 2024-07-15
Payer: MEDICARE

## 2024-07-15 PROBLEM — I87.332 CHRONIC VENOUS HYPERTENSION WITH ULCER AND INFLAMMATION INVOLVING LEFT SIDE: Status: ACTIVE | Noted: 2024-07-10

## 2024-07-15 PROBLEM — Z01.818 PREOP TESTING: Status: ACTIVE | Noted: 2024-07-10

## 2024-07-15 NOTE — TELEPHONE ENCOUNTER
Attempetd to notify patient of prework/surgery time and instruction for procedure scheduled 08/02/24 with Dr. Raygoza.  NPO after midnight.  No answer, unable to leave vm as it just continued to ring.

## 2024-07-15 NOTE — TELEPHONE ENCOUNTER
Attempted to schedule procedure with Dr. Raygoza.  Patient currently is not with schedule.  Will look and give a call back when knows date to schedule.

## 2024-07-18 ENCOUNTER — OFFICE VISIT (OUTPATIENT)
Dept: WOUND CARE | Facility: HOSPITAL | Age: 59
End: 2024-07-18
Payer: MEDICARE

## 2024-07-24 ENCOUNTER — OFFICE VISIT (OUTPATIENT)
Dept: WOUND CARE | Facility: HOSPITAL | Age: 59
End: 2024-07-24
Payer: MEDICARE

## 2024-07-24 DIAGNOSIS — L97.922 NON-PRESSURE CHRONIC ULCER OF UNSPECIFIED PART OF LEFT LOWER LEG WITH FAT LAYER EXPOSED: Primary | ICD-10-CM

## 2024-07-24 DIAGNOSIS — I87.2 VENOUS INSUFFICIENCY (CHRONIC) (PERIPHERAL): ICD-10-CM

## 2024-07-26 NOTE — H&P
7/25/2024          Nelly Sosa, APRN  [address]  [address]  [place] [address]     Kirt Ojeda  1965          Chief Complaint   Patient presents with    NEW PATIENT       Referred from Nelly Sosa for pressure ulcer of left leg. Patient states that it has been present for 6 months. Has worn compression but not helping. Patient does not smoke, has been chewing for 40 years. Popped main artery in shin at work, set up gangrene.          Dear Nelly Sosa, ESTEFANI:        HPI  I had the pleasure of seeing your patient Kirt Ojeda in the office today.  Thank you kindly for this consultation.  As you recall, Kirt Ojeda is a 59 y.o.  male who you are currently following for wound to left lower extremity.  He is referred to us due to a wound that has been present for 6 months.  He has worn compression stockings but does not seem to be helping.  He was recently admitted to the hospital at the end of June due to this wound, it was cultured and it was positive for Pseudomonas aeruginosa and Staphylococcus aureus, MRSA.  He was treated with Levaquin and doxycycline.  CT of the left lower extremity was negative for osteomyelitis, soft tissue abscess collection, or necrotizing infection.  He reports that he had necrotizing fasciitis to the left lower extremity 30 years ago and has had problems with wounds off-and-on ever since.  He denies symptoms of claudication or ischemia, although he does state on occasion he gets cramping with walking long distances and sometimes pain in his groin area.  He is morbidly obese.  He denies any strokelike symptoms.  He is unaware of a family history of AAA.  He takes 1 medication for blood pressure and has been out for 3 months.  He states that he is considering finding a new PCP but has not done so due to laziness.     Past Medical History:   Diagnosis Date    Hypertension      Past Surgical History:   Procedure Laterality Date    GASTRIC BYPASS      HERNIA REPAIR       "LEG SURGERY Left     popped main artery in shin     Social History     Socioeconomic History    Marital status: Legally    Tobacco Use    Smoking status: Never    Smokeless tobacco: Current     Types: Chew   Vaping Use    Vaping status: Never Used   Substance and Sexual Activity    Alcohol use: Yes     Alcohol/week: 24.0 standard drinks of alcohol     Types: 24 Cans of beer per week     Comment: social    Drug use: Never    Sexual activity: Defer     Family History   Problem Relation Age of Onset    Diabetes Mother     Cancer Mother     Cancer Father      No Known Allergies  Current Outpatient Medications   Medication Instructions    lisinopril (PRINIVIL,ZESTRIL) 10 mg, Oral, Daily           Review of Systems   Constitutional: Negative.  Negative for diaphoresis and fever.   HENT: Negative.     Eyes: Negative.    Respiratory: Negative.  Negative for shortness of breath and wheezing.    Cardiovascular: Negative.  Positive for leg swelling. Negative for chest pain.   Gastrointestinal: Negative.  Negative for abdominal pain.   Endocrine: Negative.    Genitourinary: Negative.    Musculoskeletal: Negative.    Skin: Negative.  Positive for wound.   Allergic/Immunologic: Negative.    Neurological: Negative.  Negative for dizziness and weakness.   Hematological: Negative.    Psychiatric/Behavioral: Negative.           /82   Pulse 105   Ht 182.9 cm (72\")   Wt (!) 162 kg (357 lb)   SpO2 95%   BMI 48.42 kg/m²         Physical Exam  Vitals and nursing note reviewed.   Constitutional:       General: He is not in acute distress.     Appearance: Normal appearance. He is morbidly obese. He is not diaphoretic.   HENT:      Head: Normocephalic. No right periorbital erythema or left periorbital erythema.      Nose: Nose normal.   Eyes:      General: No scleral icterus.     Pupils: Pupils are equal.   Cardiovascular:      Rate and Rhythm: Normal rate and regular rhythm.      Pulses: Normal pulses.           Femoral " pulses are 2+ on the right side and 2+ on the left side.       Popliteal pulses are 2+ on the right side and 2+ on the left side.        Dorsalis pedis pulses are 2+ on the right side and 2+ on the left side.        Posterior tibial pulses are 2+ on the right side and 2+ on the left side.      Heart sounds: Normal heart sounds. No murmur heard.     Comments: Spider veins to bilateral lower feet  Pulmonary:      Effort: Pulmonary effort is normal. No respiratory distress.      Breath sounds: Normal breath sounds.   Abdominal:      General: Bowel sounds are normal. There is no distension.      Palpations: Abdomen is soft.      Tenderness: There is no abdominal tenderness. There is no guarding.   Musculoskeletal:         General: No swelling or tenderness. Normal range of motion.      Cervical back: Normal range of motion and neck supple.      Right lower le+ Edema present.      Left lower le+ Edema present.   Feet:      Right foot:      Skin integrity: Skin integrity normal.      Left foot:      Skin integrity: Skin integrity normal.   Skin:     General: Skin is warm and dry.      Findings: No erythema or rash.   Neurological:      General: No focal deficit present.      Mental Status: He is alert and oriented to person, place, and time. Mental status is at baseline.      Cranial Nerves: No cranial nerve deficit.      Gait: Gait normal.   Psychiatric:         Attention and Perception: Attention normal.         Mood and Affect: Mood normal.         Behavior: Behavior normal.         Thought Content: Thought content normal.         Judgment: Judgment normal.            DIAGNOSTIC DATA   CT Lower Extremity Left With Contrast     Result Date: 2024  Narrative: CT LOWER EXTREMITY LEFT W CONTRAST- 2024 11:58 AM  HISTORY: eval for deep space infectino or any necrotizing infection; E87.1-Phcm-ozlitlwofe and hyponatremia; N30.90-Cystitis, unspecified without hematuria  COMPARISON: Plain films 2024  TOTAL  DOSE LENGTH PRODUCT: 459.72 mGy.cm. Automated exposure control was also utilized to decrease patient radiation dose.  TECHNIQUE: Axial images of the left lower extremity are obtained from the level of the proximal tibia fibula through the mid to distal foot following IV contrast. Sagittal and coronal images reformatted.  FINDINGS: There is near complete circumferential soft tissue thickening of the distal left lower extremity (sparing only posterior skin and soft tissues) with underlying subcutaneous edema. There are varicosities within the superficial tissues of the medial proximal and distal left lower extremity. There is no subcutaneous emphysema. No loculated soft tissue abscess collection. Underlying bony structures are preserved without evidence of osteomyelitis. There are arthritic changes of the talonavicular joint. Mild to moderate calcaneal spurring.        Impression: Diffuse soft tissue thickening and underlying edema of the superficial soft tissues of the distal left lower extremity from the visible proximal calf through the foot favoring cellulitis. No subcutaneous emphysema to suggest necrotizing infection. No loculated soft tissue abscess collection. Varicosities within the medial soft tissues. No underlying evidence of osteomyelitis.  This report was signed and finalized on 6/26/2024 2:02 PM by Dr. Trina Kennedy MD.       XR Tibia Fibula 2 View Left     Result Date: 6/26/2024  Narrative: EXAMINATION: XR TIBIA FIBULA 2 VW LEFT-  6/26/2024 11:31 AM  HISTORY: Ulcerated wound to anterior shin.  FINDINGS: 2 view exam of the left tibia and fibula demonstrates no evidence of acute or chronic osteomyelitis. No soft tissue gas is present. There is soft tissue swelling involving the pretibial soft tissues along the anterior tibial shaft. Phleboliths are noted within the left lower extremity. There is mild diffuse soft tissue swelling/edema.       Impression: 1.. No evidence of fracture or osteomyelitis. 2.  Soft tissue swelling/prominence involving the anterior pretibial soft tissues.  This report was signed and finalized on 6/26/2024 11:33 AM by Dr. Alexys Boogie MD.          Problem List       Patient Active Problem List   Diagnosis    Left lower extremity wound infection Pseudomonas aeruginosa and MRSA    Acute cystitis, acute UTI, Proteus mirabilis    Morbid obesity    Hyponatremia    Normocytic anemia    Leg ulcer, left with cellulitis    Primary hypertension    Stage 3a chronic kidney disease            Visit Diagnosis       ICD-10-CM ICD-9-CM   1. Chronic venous hypertension with ulcer and inflammation involving left side  I87.332 459.33   2. Primary hypertension  I10 401.9                  Plan: After thoroughly evaluating Kirt Ojeda, I believe the best course of action is to proceed with left lower extremity radiofrequency ablation.  Risks of radiofrequency ablation include, but are not limited to, bleeding, infection, vessel damage, nerve damage, DVT, phlebitis, and pulmonary embolus.  The patient understands these risks and wishes to proceed with procedure. He has a large wound to his anterior left lower extremity, being followed by wound care.  After reviewing his venous duplex he has significant venous insufficiency to his left lower extremity making him a good candidate for radiofrequency ablation.  He also has venous insufficiency to his right lower extremity, although that leg does not give him problems.  He denies heaviness or achiness at rest, it swells minimally.  He has previously worn compression stockings daily without improvement.  I would like him to continue wearing compression stocking to his right lower extremity, elevating his legs when not on them.  He has been out of his blood pressure medication for 3 months and his blood pressure is elevated at 144/82, I have given him a prescription for lisinopril 10 mg once daily.  He will need to find a new PCP.  The patient can continue taking  their current medication regimen as previously planned.  This was all discussed in full with complete understanding.     Thank you for allowing me to participate in the care of your patient.  Please do not hesitate with any questions or concerns.  I will keep you aware of any further encounters with Kirt Ojeda.           Sincerely yours,        Van Raygoza, DO

## 2024-07-31 ENCOUNTER — PRE-ADMISSION TESTING (OUTPATIENT)
Dept: PREADMISSION TESTING | Facility: HOSPITAL | Age: 59
End: 2024-07-31
Payer: MEDICARE

## 2024-07-31 ENCOUNTER — OFFICE VISIT (OUTPATIENT)
Dept: WOUND CARE | Facility: HOSPITAL | Age: 59
End: 2024-07-31
Payer: MEDICARE

## 2024-07-31 VITALS
HEART RATE: 78 BPM | SYSTOLIC BLOOD PRESSURE: 138 MMHG | WEIGHT: 315 LBS | OXYGEN SATURATION: 98 % | RESPIRATION RATE: 16 BRPM | HEIGHT: 71 IN | BODY MASS INDEX: 44.1 KG/M2 | DIASTOLIC BLOOD PRESSURE: 86 MMHG

## 2024-07-31 DIAGNOSIS — Z01.818 PREOP TESTING: ICD-10-CM

## 2024-07-31 DIAGNOSIS — I87.2 VENOUS INSUFFICIENCY (CHRONIC) (PERIPHERAL): ICD-10-CM

## 2024-07-31 DIAGNOSIS — L97.922 NON-PRESSURE CHRONIC ULCER OF UNSPECIFIED PART OF LEFT LOWER LEG WITH FAT LAYER EXPOSED: Primary | ICD-10-CM

## 2024-07-31 DIAGNOSIS — I87.332 CHRONIC VENOUS HYPERTENSION WITH ULCER AND INFLAMMATION INVOLVING LEFT SIDE: ICD-10-CM

## 2024-07-31 LAB
DEPRECATED RDW RBC AUTO: 58.7 FL (ref 37–54)
ERYTHROCYTE [DISTWIDTH] IN BLOOD BY AUTOMATED COUNT: 15.9 % (ref 12.3–15.4)
HCT VFR BLD AUTO: 39.5 % (ref 37.5–51)
HGB BLD-MCNC: 12.9 G/DL (ref 13–17.7)
MCH RBC QN AUTO: 32.8 PG (ref 26.6–33)
MCHC RBC AUTO-ENTMCNC: 32.7 G/DL (ref 31.5–35.7)
MCV RBC AUTO: 100.5 FL (ref 79–97)
PLATELET # BLD AUTO: 254 10*3/MM3 (ref 140–450)
PMV BLD AUTO: 10.7 FL (ref 6–12)
QT INTERVAL: 394 MS
QTC INTERVAL: 425 MS
RBC # BLD AUTO: 3.93 10*6/MM3 (ref 4.14–5.8)
WBC NRBC COR # BLD AUTO: 6.74 10*3/MM3 (ref 3.4–10.8)

## 2024-07-31 PROCEDURE — 93005 ELECTROCARDIOGRAM TRACING: CPT

## 2024-07-31 PROCEDURE — 85027 COMPLETE CBC AUTOMATED: CPT

## 2024-07-31 PROCEDURE — 36415 COLL VENOUS BLD VENIPUNCTURE: CPT

## 2024-07-31 RX ORDER — ACETAMINOPHEN 325 MG/1
650 TABLET ORAL EVERY 6 HOURS PRN
COMMUNITY

## 2024-07-31 RX ORDER — IBUPROFEN 400 MG/1
400 TABLET ORAL EVERY 6 HOURS PRN
COMMUNITY

## 2024-07-31 NOTE — DISCHARGE INSTRUCTIONS
Preparing for Surgery  Follow these instructions before the procedure:  Several days or weeks before your procedure        Ask your health care provider about:  Changing or stopping your regular medicines. This is especially important if you are taking diabetes medicines or blood thinners.  Taking medicines such as aspirin and ibuprofen. These medicines can thin your blood. Do not take these medicines unless your health care provider tells you to take them.  Taking over-the-counter medicines, vitamins, herbs, and supplements.    Contact your surgeon if you:  Develop a fever of more than 100.4°F (38°C) or other feelings of illness during the 48 hours before your surgery.  Have symptoms that get worse.  Have questions or concerns about your surgery.  If you are going home the same day of your surgery you will need to arrange for a responsible adult, age 18 years old or older, to drive you home from the hospital and stay with you for 24 hours. Verification of the  will be made prior to any procedure requiring sedation. You may not go home in a taxi or any form of public transportation by yourself.     Day before your procedure  Medication(s) you need to stop 24 hours before your surgery: LISINOPRIL    24 hours before your procedure DO NOT drink alcoholic beverages or smoke.  24 hours before your procedure STOP taking Erectile Dysfunction medication (i.e.,Cialis, Viagra)   You may be asked to shower with a germ-killing soap.  Day of your procedure       8 hours before your procedure STOP all food, any dairy products, and full liquids. This includes hard candy, chewing gum or mints. This is extremely important to prevent serious complications.     Up to 2 hours before your scheduled arrival time, you may have clear liquids no cream, powder, or pulp of any kind. Safe options are water, black coffee, plain tea, soda, Gatorade/Powerade, clear broth, apple juice.    2 hours before your scheduled arrival time, STOP  drinking clear liquids.    You may need to take another shower with a germ-killing soap before you leave home in the morning. Do not use perfumes, colognes, or body lotions.  Wear comfortable loose-fitting clothing.  Remove all jewelry including body piercing and rings, dark colored nail polish, and make up prior to arrival at the hospital. Leave all valuables at home.   Bring your hearing aids if you rely on them.  Do not wear contact lenses. If you wear eyeglasses remember to bring a case to store them in while you are in surgery.  Do not use denture adhesives since you will be asked to remove them during your surgery.    You do not need to bring your home medications into the hospital.   Bring your sleep apnea device with you on the day of your surgery (if this applies to you).  If you wear portable oxygen, bring it with you.   If you are staying overnight, you may bring a bag of items you may need such as slippers, robe and a change of clothes for your discharge. You may want to leave these items in the car until you are ready for them since your family will take your belongings when you leave the pre-operative area.  Arrive at the hospital as scheduled by the office. You will be asked to arrive 2 hours prior to your surgery time in order to prepare for your procedure.  When you arrive at the hospital  Go to the registration desk located at the main entrance of the hospital.  After registration is completed, you will be given a beeper and a sticker sheet. Take the stickers to Outpatient Surgery and place in the tray at the end of the desk to notify the staff that you have arrived and registered.   Return to the lobby to wait. You are not always called back according to the time of arrival but rather the time your doctor will be ready.  When your beeper lights up and vibrates proceed through the double doors, under the stairs, and a member of the Outpatient Surgery staff will escort you to your preoperative room.    How to Use Chlorhexidine Before Surgery  Chlorhexidine gluconate (CHG) is a germ-killing (antiseptic) solution that is used to clean the skin. It can get rid of the bacteria that normally live on the skin and can keep them away for about 24 hours. To clean your skin with CHG, you may be given:  A CHG solution to use in the shower or as part of a sponge bath.  A prepackaged cloth that contains CHG.  Cleaning your skin with CHG may help lower the risk for infection:  While you are staying in the intensive care unit of the hospital.  If you have a vascular access, such as a central line, to provide short-term or long-term access to your veins.  If you have a catheter to drain urine from your bladder.  If you are on a ventilator. A ventilator is a machine that helps you breathe by moving air in and out of your lungs.  After surgery.  What are the risks?  Risks of using CHG include:  A skin reaction.  Hearing loss, if CHG gets in your ears and you have a perforated eardrum.  Eye injury, if CHG gets in your eyes and is not rinsed out.  The CHG product catching fire.  Make sure that you avoid smoking and flames after applying CHG to your skin.  Do not use CHG:  If you have a chlorhexidine allergy or have previously reacted to chlorhexidine.  On babies younger than 2 months of age.  How to use CHG solution  Use CHG only as told by your health care provider, and follow the instructions on the label.  Use the full amount of CHG as directed. Usually, this is one bottle.  During a shower    Follow these steps when using CHG solution during a shower (unless your health care provider gives you different instructions):  Start the shower.  Use your normal soap and shampoo to wash your face and hair.  Turn off the shower or move out of the shower stream.  Pour the CHG onto a clean washcloth. Do not use any type of brush or rough-edged sponge.  Starting at your neck, lather your body down to your toes. Make sure you follow these  instructions:  If you will be having surgery, pay special attention to the part of your body where you will be having surgery. Scrub this area for at least 1 minute.  Do not use CHG on your head or face. If the solution gets into your ears or eyes, rinse them well with water.  Avoid your genital area.  Avoid any areas of skin that have broken skin, cuts, or scrapes.  Scrub your back and under your arms. Make sure to wash skin folds.  Let the lather sit on your skin for 1-2 minutes or as long as told by your health care provider.  Thoroughly rinse your entire body in the shower. Make sure that all body creases and crevices are rinsed well.  Dry off with a clean towel. Do not put any substances on your body afterward--such as powder, lotion, or perfume--unless you are told to do so by your health care provider. Only use lotions that are recommended by the .  Put on clean clothes or pajamas.  If it is the night before your surgery, sleep in clean sheets.     During a sponge bath  Follow these steps when using CHG solution during a sponge bath (unless your health care provider gives you different instructions):  Use your normal soap and shampoo to wash your face and hair.  Pour the CHG onto a clean washcloth.  Starting at your neck, lather your body down to your toes. Make sure you follow these instructions:  If you will be having surgery, pay special attention to the part of your body where you will be having surgery. Scrub this area for at least 1 minute.  Do not use CHG on your head or face. If the solution gets into your ears or eyes, rinse them well with water.  Avoid your genital area.  Avoid any areas of skin that have broken skin, cuts, or scrapes.  Scrub your back and under your arms. Make sure to wash skin folds.  Let the lather sit on your skin for 1-2 minutes or as long as told by your health care provider.  Using a different clean, wet washcloth, thoroughly rinse your entire body. Make sure that  all body creases and crevices are rinsed well.  Dry off with a clean towel. Do not put any substances on your body afterward--such as powder, lotion, or perfume--unless you are told to do so by your health care provider. Only use lotions that are recommended by the .  Put on clean clothes or pajamas.  If it is the night before your surgery, sleep in clean sheets.  How to use CHG prepackaged cloths  Only use CHG cloths as told by your health care provider, and follow the instructions on the label.  Use the CHG cloth on clean, dry skin.  Do not use the CHG cloth on your head or face unless your health care provider tells you to.  When washing with the CHG cloth:  Avoid your genital area.  Avoid any areas of skin that have broken skin, cuts, or scrapes.  Before surgery    Follow these steps when using a CHG cloth to clean before surgery (unless your health care provider gives you different instructions):  Using the CHG cloth, vigorously scrub the part of your body where you will be having surgery. Scrub using a back-and-forth motion for 3 minutes. The area on your body should be completely wet with CHG when you are done scrubbing.  Do not rinse. Discard the cloth and let the area air-dry. Do not put any substances on the area afterward, such as powder, lotion, or perfume.  Put on clean clothes or pajamas.  If it is the night before your surgery, sleep in clean sheets.     For general bathing  Follow these steps when using CHG cloths for general bathing (unless your health care provider gives you different instructions).  Use a separate CHG cloth for each area of your body. Make sure you wash between any folds of skin and between your fingers and toes. Wash your body in the following order, switching to a new cloth after each step:  The front of your neck, shoulders, and chest.  Both of your arms, under your arms, and your hands.  Your stomach and groin area, avoiding the genitals.  Your right leg and  foot.  Your left leg and foot.  The back of your neck, your back, and your buttocks.  Do not rinse. Discard the cloth and let the area air-dry. Do not put any substances on your body afterward--such as powder, lotion, or perfume--unless you are told to do so by your health care provider. Only use lotions that are recommended by the .  Put on clean clothes or pajamas.  Contact a health care provider if:  Your skin gets irritated after scrubbing.  You have questions about using your solution or cloth.  You swallow any chlorhexidine. Call your local poison control center (1-504.985.4062 in the [place]  Get help right away if:  Your eyes itch badly, or they become very red or swollen.  Your skin itches badly and is red or swollen.  Your hearing changes.  You have trouble seeing.  You have swelling or tingling in your mouth or throat.  You have trouble breathing.  These symptoms may represent a serious problem that is an emergency. Do not wait to see if the symptoms will go away. Get medical help right away. Call your local emergency services (674 in the [place] Do not drive yourself to the hospital.  Summary  Chlorhexidine gluconate (CHG) is a germ-killing (antiseptic) solution that is used to clean the skin. Cleaning your skin with CHG may help to lower your risk for infection.  You may be given CHG to use for bathing. It may be in a bottle or in a prepackaged cloth to use on your skin. Carefully follow your health care provider's instructions and the instructions on the product label.  Do not use CHG if you have a chlorhexidine allergy.  Contact your health care provider if your skin gets irritated after scrubbing.  This information is not intended to replace advice given to you by your health care provider. Make sure you discuss any questions you have with your health care provider.  Document Revised: 04/17/2023 Document Reviewed: 02/28/2022  Elsevier Patient Education © 2023 [employer]

## 2024-08-01 ENCOUNTER — TELEPHONE (OUTPATIENT)
Dept: VASCULAR SURGERY | Facility: CLINIC | Age: 59
End: 2024-08-01
Payer: MEDICARE

## 2024-08-01 NOTE — TELEPHONE ENCOUNTER
Pt sister in law expressed understanding of arrival time of 7 am for procedure scheduled with Dr. Raygoza on 08/02/24.  Pt advised NPO after midnight.

## 2024-08-02 ENCOUNTER — ANESTHESIA EVENT (OUTPATIENT)
Dept: PERIOP | Facility: HOSPITAL | Age: 59
End: 2024-08-02
Payer: MEDICARE

## 2024-08-02 ENCOUNTER — HOSPITAL ENCOUNTER (OUTPATIENT)
Facility: HOSPITAL | Age: 59
Setting detail: HOSPITAL OUTPATIENT SURGERY
Discharge: HOME | End: 2024-08-02
Attending: SURGERY | Admitting: SURGERY
Payer: MEDICARE

## 2024-08-02 ENCOUNTER — ANESTHESIA (OUTPATIENT)
Dept: PERIOP | Facility: HOSPITAL | Age: 59
End: 2024-08-02
Payer: MEDICARE

## 2024-08-02 ENCOUNTER — APPOINTMENT (OUTPATIENT)
Dept: ULTRASOUND IMAGING | Facility: HOSPITAL | Age: 59
End: 2024-08-02
Payer: MEDICARE

## 2024-08-02 VITALS
HEART RATE: 80 BPM | DIASTOLIC BLOOD PRESSURE: 69 MMHG | TEMPERATURE: 97.2 F | OXYGEN SATURATION: 98 % | RESPIRATION RATE: 16 BRPM | SYSTOLIC BLOOD PRESSURE: 118 MMHG

## 2024-08-02 DIAGNOSIS — I87.332 CHRONIC VENOUS HYPERTENSION WITH ULCER AND INFLAMMATION INVOLVING LEFT SIDE: ICD-10-CM

## 2024-08-02 DIAGNOSIS — Z01.818 PREOP TESTING: ICD-10-CM

## 2024-08-02 PROCEDURE — 36475 ENDOVENOUS RF 1ST VEIN: CPT | Performed by: SURGERY

## 2024-08-02 PROCEDURE — 25010000002 PROPOFOL 10 MG/ML EMULSION: Performed by: NURSE ANESTHETIST, CERTIFIED REGISTERED

## 2024-08-02 PROCEDURE — 25010000002 FENTANYL CITRATE (PF) 100 MCG/2ML SOLUTION: Performed by: NURSE ANESTHETIST, CERTIFIED REGISTERED

## 2024-08-02 PROCEDURE — C1888 ENDOVAS NON-CARDIAC ABL CATH: HCPCS | Performed by: SURGERY

## 2024-08-02 PROCEDURE — 25010000002 CEFAZOLIN PER 500 MG: Performed by: NURSE PRACTITIONER

## 2024-08-02 PROCEDURE — C1769 GUIDE WIRE: HCPCS | Performed by: SURGERY

## 2024-08-02 PROCEDURE — 25810000003 LACTATED RINGERS PER 1000 ML: Performed by: SURGERY

## 2024-08-02 PROCEDURE — 25810000003 SODIUM CHLORIDE PER 500 ML: Performed by: SURGERY

## 2024-08-02 PROCEDURE — 76937 US GUIDE VASCULAR ACCESS: CPT

## 2024-08-02 PROCEDURE — C1894 INTRO/SHEATH, NON-LASER: HCPCS | Performed by: SURGERY

## 2024-08-02 RX ORDER — HYDROCODONE BITARTRATE AND ACETAMINOPHEN 5; 325 MG/1; MG/1
1 TABLET ORAL ONCE AS NEEDED
Status: DISCONTINUED | OUTPATIENT
Start: 2024-08-02 | End: 2024-08-02 | Stop reason: HOSPADM

## 2024-08-02 RX ORDER — HYDROCODONE BITARTRATE AND ACETAMINOPHEN 5; 325 MG/1; MG/1
1 TABLET ORAL EVERY 4 HOURS PRN
Status: DISCONTINUED | OUTPATIENT
Start: 2024-08-02 | End: 2024-08-02 | Stop reason: HOSPADM

## 2024-08-02 RX ORDER — ONDANSETRON 2 MG/ML
4 INJECTION INTRAMUSCULAR; INTRAVENOUS ONCE AS NEEDED
Status: DISCONTINUED | OUTPATIENT
Start: 2024-08-02 | End: 2024-08-02 | Stop reason: HOSPADM

## 2024-08-02 RX ORDER — IBUPROFEN 600 MG/1
600 TABLET ORAL EVERY 6 HOURS PRN
Status: DISCONTINUED | OUTPATIENT
Start: 2024-08-02 | End: 2024-08-02 | Stop reason: HOSPADM

## 2024-08-02 RX ORDER — FENTANYL CITRATE 50 UG/ML
INJECTION, SOLUTION INTRAMUSCULAR; INTRAVENOUS AS NEEDED
Status: DISCONTINUED | OUTPATIENT
Start: 2024-08-02 | End: 2024-08-02 | Stop reason: SURG

## 2024-08-02 RX ORDER — NALOXONE HCL 0.4 MG/ML
0.4 VIAL (ML) INJECTION AS NEEDED
Status: DISCONTINUED | OUTPATIENT
Start: 2024-08-02 | End: 2024-08-02 | Stop reason: HOSPADM

## 2024-08-02 RX ORDER — SODIUM CHLORIDE 0.9 % (FLUSH) 0.9 %
10 SYRINGE (ML) INJECTION AS NEEDED
Status: DISCONTINUED | OUTPATIENT
Start: 2024-08-02 | End: 2024-08-02 | Stop reason: HOSPADM

## 2024-08-02 RX ORDER — SODIUM CHLORIDE 0.9 % (FLUSH) 0.9 %
10 SYRINGE (ML) INJECTION EVERY 12 HOURS SCHEDULED
Status: DISCONTINUED | OUTPATIENT
Start: 2024-08-02 | End: 2024-08-02 | Stop reason: HOSPADM

## 2024-08-02 RX ORDER — FENTANYL CITRATE 50 UG/ML
50 INJECTION, SOLUTION INTRAMUSCULAR; INTRAVENOUS
Status: DISCONTINUED | OUTPATIENT
Start: 2024-08-02 | End: 2024-08-02 | Stop reason: HOSPADM

## 2024-08-02 RX ORDER — HYDROCODONE BITARTRATE AND ACETAMINOPHEN 10; 325 MG/1; MG/1
1 TABLET ORAL EVERY 4 HOURS PRN
Status: DISCONTINUED | OUTPATIENT
Start: 2024-08-02 | End: 2024-08-02 | Stop reason: HOSPADM

## 2024-08-02 RX ORDER — LIDOCAINE HYDROCHLORIDE 20 MG/ML
INJECTION, SOLUTION EPIDURAL; INFILTRATION; INTRACAUDAL; PERINEURAL AS NEEDED
Status: DISCONTINUED | OUTPATIENT
Start: 2024-08-02 | End: 2024-08-02 | Stop reason: SURG

## 2024-08-02 RX ORDER — FLUMAZENIL 0.1 MG/ML
0.2 INJECTION INTRAVENOUS AS NEEDED
Status: DISCONTINUED | OUTPATIENT
Start: 2024-08-02 | End: 2024-08-02 | Stop reason: HOSPADM

## 2024-08-02 RX ORDER — LIDOCAINE HYDROCHLORIDE 10 MG/ML
0.5 INJECTION, SOLUTION EPIDURAL; INFILTRATION; INTRACAUDAL; PERINEURAL ONCE AS NEEDED
Status: DISCONTINUED | OUTPATIENT
Start: 2024-08-02 | End: 2024-08-02 | Stop reason: HOSPADM

## 2024-08-02 RX ORDER — HYDROCODONE BITARTRATE AND ACETAMINOPHEN 5; 325 MG/1; MG/1
1 TABLET ORAL EVERY 6 HOURS PRN
Qty: 10 TABLET | Refills: 0 | Status: SHIPPED | OUTPATIENT
Start: 2024-08-02

## 2024-08-02 RX ORDER — SODIUM CHLORIDE 9 MG/ML
INJECTION, SOLUTION INTRAVENOUS AS NEEDED
Status: DISCONTINUED | OUTPATIENT
Start: 2024-08-02 | End: 2024-08-02 | Stop reason: HOSPADM

## 2024-08-02 RX ORDER — FENTANYL CITRATE 50 UG/ML
25 INJECTION, SOLUTION INTRAMUSCULAR; INTRAVENOUS
Status: DISCONTINUED | OUTPATIENT
Start: 2024-08-02 | End: 2024-08-02 | Stop reason: HOSPADM

## 2024-08-02 RX ORDER — SODIUM CHLORIDE, SODIUM LACTATE, POTASSIUM CHLORIDE, CALCIUM CHLORIDE 600; 310; 30; 20 MG/100ML; MG/100ML; MG/100ML; MG/100ML
9 INJECTION, SOLUTION INTRAVENOUS CONTINUOUS
Status: DISCONTINUED | OUTPATIENT
Start: 2024-08-02 | End: 2024-08-02 | Stop reason: HOSPADM

## 2024-08-02 RX ORDER — DROPERIDOL 2.5 MG/ML
0.62 INJECTION, SOLUTION INTRAMUSCULAR; INTRAVENOUS ONCE AS NEEDED
Status: DISCONTINUED | OUTPATIENT
Start: 2024-08-02 | End: 2024-08-02 | Stop reason: HOSPADM

## 2024-08-02 RX ORDER — PROPOFOL 10 MG/ML
VIAL (ML) INTRAVENOUS AS NEEDED
Status: DISCONTINUED | OUTPATIENT
Start: 2024-08-02 | End: 2024-08-02 | Stop reason: SURG

## 2024-08-02 RX ORDER — LABETALOL HYDROCHLORIDE 5 MG/ML
5 INJECTION, SOLUTION INTRAVENOUS
Status: DISCONTINUED | OUTPATIENT
Start: 2024-08-02 | End: 2024-08-02 | Stop reason: HOSPADM

## 2024-08-02 RX ORDER — SODIUM CHLORIDE 0.9 % (FLUSH) 0.9 %
3 SYRINGE (ML) INJECTION AS NEEDED
Status: DISCONTINUED | OUTPATIENT
Start: 2024-08-02 | End: 2024-08-02 | Stop reason: HOSPADM

## 2024-08-02 RX ORDER — SODIUM CHLORIDE, SODIUM LACTATE, POTASSIUM CHLORIDE, CALCIUM CHLORIDE 600; 310; 30; 20 MG/100ML; MG/100ML; MG/100ML; MG/100ML
1000 INJECTION, SOLUTION INTRAVENOUS CONTINUOUS
Status: DISCONTINUED | OUTPATIENT
Start: 2024-08-02 | End: 2024-08-02 | Stop reason: HOSPADM

## 2024-08-02 RX ORDER — DEXTROSE MONOHYDRATE 25 G/50ML
12.5 INJECTION, SOLUTION INTRAVENOUS AS NEEDED
Status: DISCONTINUED | OUTPATIENT
Start: 2024-08-02 | End: 2024-08-02 | Stop reason: HOSPADM

## 2024-08-02 RX ADMIN — PROPOFOL INJECTABLE EMULSION 125 MCG/KG/MIN: 10 INJECTION, EMULSION INTRAVENOUS at 09:33

## 2024-08-02 RX ADMIN — SODIUM CHLORIDE, POTASSIUM CHLORIDE, SODIUM LACTATE AND CALCIUM CHLORIDE 1000 ML: 600; 310; 30; 20 INJECTION, SOLUTION INTRAVENOUS at 08:06

## 2024-08-02 RX ADMIN — CEFAZOLIN 2000 MG: 2 INJECTION, POWDER, FOR SOLUTION INTRAMUSCULAR; INTRAVENOUS at 09:35

## 2024-08-02 RX ADMIN — LIDOCAINE HYDROCHLORIDE 40 MG: 20 INJECTION, SOLUTION EPIDURAL; INFILTRATION; INTRACAUDAL; PERINEURAL at 09:34

## 2024-08-02 RX ADMIN — FENTANYL CITRATE 100 MCG: 50 INJECTION, SOLUTION INTRAMUSCULAR; INTRAVENOUS at 09:33

## 2024-08-02 RX ADMIN — PROPOFOL 50 MG: 10 INJECTION, EMULSION INTRAVENOUS at 09:33

## 2024-08-02 NOTE — ANESTHESIA POSTPROCEDURE EVALUATION
Patient: Kirt Ojeda    Procedure Summary       Date: 08/02/24 Room / Location: [facility] OR  / [facility] HYBRID OR    Anesthesia Start: 0932 Anesthesia Stop: 1019    Procedure: LEFT LOWER EXTREMITY RADIO FREQUENCY ABLATION (Left: Leg Lower) Diagnosis:       Chronic venous hypertension with ulcer and inflammation involving left side      Preop testing      (Chronic venous hypertension with ulcer and inflammation involving left side [I87.332])      (Preop testing [Z01.818])    Surgeons: Van Raygoza DO Provider: Moncho Escobar CRNA    Anesthesia Type: MAC ASA Status: 3            Anesthesia Type: MAC    Vitals  Vitals Value Taken Time   /69 08/02/24 1146   Temp     Pulse 85 08/02/24 1200   Resp     SpO2 96 % 08/02/24 1200   Vitals shown include unfiled device data.        Post Anesthesia Care and Evaluation    Patient location during evaluation: PACU  Patient participation: complete - patient participated  Level of consciousness: awake and alert  Pain management: adequate    Airway patency: patent  Anesthetic complications: No anesthetic complications  PONV Status: none  Cardiovascular status: acceptable and hemodynamically stable  Respiratory status: acceptable  Hydration status: acceptable    Comments: Blood pressure 118/69, pulse 80, temperature 97.2 °F (36.2 °C), temperature source Temporal, resp. rate 16, SpO2 98%.    Patient discharged from PACU based upon Erlinda score. Please see RN notes for further details

## 2024-08-02 NOTE — OP NOTE
Kirt Ojeda  8/2/2024     PREOPERATIVE DIAGNOSIS: Chronic venous hypertension with ulcer and inflammation involving left side [I87.332]  Preop testing [Z01.818]     POSTOPERATIVE DIAGNOSIS: Post-Op Diagnosis Codes:     * Chronic venous hypertension with ulcer and inflammation involving left side [I87.332]     * Preop testing [Z01.818]     PROCEDURE PERFORMED:   1.  Ultrasound-guided cannulation of the left lower extremity greater saphenous vein  2.  Radiofrequency ablation of the left lower extremity greater saphenous vein  3.  Placement of an Unna boot to the left lower extremity     SURGEON: Van Raygoza DO      ANESTHESIA: MAC    PREPARATION: Routine.    STAFF: Circulator: Mabel Ritter RN  Scrub Person: Kaylene Rascon  Vascular Ultrasound Technician: Fabienne Sandoval    Estimated Blood Loss: minimal    SPECIMENS: None    COMPLICATIONS: None    INDICATIONS: Kirt Ojeda is a 59 y.o. male who you are currently following for wound to left lower extremity. He is referred to us due to a wound that has been present for 6 months. He has worn compression stockings but does not seem to be helping. He was recently admitted to the hospital at the end of June due to this wound, it was cultured and it was positive for Pseudomonas aeruginosa and Staphylococcus aureus, MRSA. He was treated with Levaquin and doxycycline. CT of the left lower extremity was negative for osteomyelitis, soft tissue abscess collection, or necrotizing infection. He reports that he had necrotizing fasciitis to the left lower extremity 30 years ago and has had problems with wounds off-and-on ever since. He denies symptoms of claudication or ischemia, although he does state on occasion he gets cramping with walking long distances and sometimes pain in his groin area. He is morbidly obese. He denies any strokelike symptoms. He is unaware of a family history of AAA. He takes 1 medication for blood pressure and has been out for 3 months. He  states that he is considering finding a new PCP but has not done so due to laziness. The indications, risks, and possible complications of the procedure were explained to the patient, who voiced understanding and wished to proceed with surgery.     PROCEDURE IN DETAIL:   The patient was taken to the operating room and placed on the operating table in a supine position. After MAC anesthesia was obtained, the left lower extremity was prepped and draped in a sterile manner.  Under ultrasound guidance and using a micropuncture technique the left lower extremity greater saphenous vein was cannulated and a microwire was placed.  This was confirmed under ultrasound.  A small stab incision was made with 11 blade and a 7 Wolof sheath was placed.  The patient was placed in Trendelenburg position and the saphenofemoral junction was identified under ultrasound.  The catheter was placed up to the junction and pulled back 3 cm and marked.  Next, tumescent fluid was instilled along the entire length of the vein under ultrasound guidance.  Once sufficient tumescent fluid was placed radiofrequency ablation had commenced.  There was a total of 10 RF cycles for a total treatment length of 59 cm for a total treatment time of 3 minutes and 20 seconds.  There was an average of 11 W at an average temperature of 120°C.  Upon completion of the ablation the catheter and sheath were removed.  Direct pressure was held for an additional 5-10 minutes to ensure hemostasis.  On the lower leg, Adaptics were placed over the wounds and the lower extremity was wrapped in an Unna boot.  The upper thigh was wrapped with an Ace wrap. The patient tolerated the procedure well. Sponge and needle counts were correct. The patient was then awakened and transferred to the outpatient center in stable condition.  Van Raygoza,   Date: 8/2/2024 Time: 10:12 CDT    CC:Nelly Sosa, ESTEFANI

## 2024-08-02 NOTE — ANESTHESIA PREPROCEDURE EVALUATION
Anesthesia Evaluation     Patient summary reviewed   no history of anesthetic complications:   NPO Solid Status: > 8 hours             Airway   Mallampati: II  Dental      Pulmonary    (-) COPD, asthma, sleep apnea, not a smoker  Cardiovascular     (+) hypertension  (-) pacemaker, past MI, angina, cardiac stents      Neuro/Psych  (-) seizures, TIA, CVA  GI/Hepatic/Renal/Endo    (+) morbid obesity, renal disease- CRI  (-) GERD, liver disease, diabetes    Musculoskeletal     Abdominal    Substance History      OB/GYN          Other                    Anesthesia Plan    ASA 3     MAC     intravenous induction     Anesthetic plan, risks, benefits, and alternatives have been provided, discussed and informed consent has been obtained with: patient.    CODE STATUS:

## 2024-08-05 ENCOUNTER — OFFICE VISIT (OUTPATIENT)
Dept: WOUND CARE | Facility: HOSPITAL | Age: 59
End: 2024-08-05
Payer: MEDICARE

## 2024-08-08 ENCOUNTER — OFFICE VISIT (OUTPATIENT)
Dept: WOUND CARE | Facility: HOSPITAL | Age: 59
End: 2024-08-08
Payer: MEDICARE

## 2024-08-08 DIAGNOSIS — R60.0 LOCALIZED EDEMA: ICD-10-CM

## 2024-08-08 DIAGNOSIS — I87.2 VENOUS INSUFFICIENCY (CHRONIC) (PERIPHERAL): ICD-10-CM

## 2024-08-08 DIAGNOSIS — L97.922 NON-PRESSURE CHRONIC ULCER OF UNSPECIFIED PART OF LEFT LOWER LEG WITH FAT LAYER EXPOSED: Primary | ICD-10-CM

## 2024-08-09 LAB
QT INTERVAL: 394 MS
QTC INTERVAL: 425 MS

## 2024-08-13 ENCOUNTER — OFFICE VISIT (OUTPATIENT)
Dept: WOUND CARE | Facility: HOSPITAL | Age: 59
End: 2024-08-13
Payer: MEDICARE

## 2024-08-13 DIAGNOSIS — L97.922 NON-PRESSURE CHRONIC ULCER OF UNSPECIFIED PART OF LEFT LOWER LEG WITH FAT LAYER EXPOSED: Primary | ICD-10-CM

## 2024-08-26 ENCOUNTER — HOSPITAL ENCOUNTER (OUTPATIENT)
Dept: ULTRASOUND IMAGING | Facility: HOSPITAL | Age: 59
Discharge: HOME | End: 2024-08-26
Admitting: SURGERY
Payer: MEDICARE

## 2024-08-26 DIAGNOSIS — I87.332 CHRONIC VENOUS HYPERTENSION WITH ULCER AND INFLAMMATION INVOLVING LEFT SIDE: ICD-10-CM

## 2024-08-26 PROCEDURE — 93971 EXTREMITY STUDY: CPT | Performed by: SURGERY

## 2024-08-26 PROCEDURE — 93971 EXTREMITY STUDY: CPT

## 2024-08-28 ENCOUNTER — OFFICE VISIT (OUTPATIENT)
Dept: WOUND CARE | Facility: HOSPITAL | Age: 59
End: 2024-08-28
Payer: MEDICARE

## 2024-08-28 DIAGNOSIS — L97.922 NON-PRESSURE CHRONIC ULCER OF UNSPECIFIED PART OF LEFT LOWER LEG WITH FAT LAYER EXPOSED: Primary | ICD-10-CM

## 2024-09-13 ENCOUNTER — OFFICE VISIT (OUTPATIENT)
Dept: WOUND CARE | Facility: HOSPITAL | Age: 59
End: 2024-09-13
Payer: MEDICARE

## 2024-09-13 DIAGNOSIS — L97.922 NON-PRESSURE CHRONIC ULCER OF UNSPECIFIED PART OF LEFT LOWER LEG WITH FAT LAYER EXPOSED: Primary | ICD-10-CM

## 2024-09-13 PROCEDURE — 11042 DBRDMT SUBQ TIS 1ST 20SQCM/<: CPT

## 2024-10-02 ENCOUNTER — OFFICE VISIT (OUTPATIENT)
Dept: WOUND CARE | Facility: HOSPITAL | Age: 59
End: 2024-10-02
Payer: MEDICARE

## 2024-10-02 DIAGNOSIS — R60.0 LOCALIZED EDEMA: ICD-10-CM

## 2024-10-02 DIAGNOSIS — L97.922 NON-PRESSURE CHRONIC ULCER OF UNSPECIFIED PART OF LEFT LOWER LEG WITH FAT LAYER EXPOSED: Primary | ICD-10-CM

## 2024-10-02 DIAGNOSIS — I87.2 VENOUS INSUFFICIENCY (CHRONIC) (PERIPHERAL): ICD-10-CM

## 2024-10-16 ENCOUNTER — OFFICE VISIT (OUTPATIENT)
Dept: WOUND CARE | Facility: HOSPITAL | Age: 59
End: 2024-10-16
Payer: MEDICARE

## 2024-10-25 ENCOUNTER — OFFICE VISIT (OUTPATIENT)
Dept: WOUND CARE | Facility: HOSPITAL | Age: 59
End: 2024-10-25
Payer: MEDICARE

## 2024-11-08 ENCOUNTER — OFFICE VISIT (OUTPATIENT)
Dept: WOUND CARE | Facility: HOSPITAL | Age: 59
End: 2024-11-08
Payer: MEDICARE

## 2025-03-09 ENCOUNTER — APPOINTMENT (OUTPATIENT)
Dept: CT IMAGING | Age: 60
DRG: 853 | End: 2025-03-09
Payer: MEDICARE

## 2025-03-09 ENCOUNTER — ANESTHESIA (OUTPATIENT)
Dept: OPERATING ROOM | Age: 60
End: 2025-03-09
Payer: MEDICARE

## 2025-03-09 ENCOUNTER — HOSPITAL ENCOUNTER (INPATIENT)
Age: 60
LOS: 3 days | Discharge: HOME | DRG: 853 | End: 2025-03-12
Attending: EMERGENCY MEDICINE | Admitting: INTERNAL MEDICINE
Payer: MEDICARE

## 2025-03-09 ENCOUNTER — ANESTHESIA EVENT (OUTPATIENT)
Dept: OPERATING ROOM | Age: 60
End: 2025-03-09
Payer: MEDICARE

## 2025-03-09 ENCOUNTER — APPOINTMENT (OUTPATIENT)
Dept: GENERAL RADIOLOGY | Age: 60
DRG: 853 | End: 2025-03-09
Payer: MEDICARE

## 2025-03-09 DIAGNOSIS — R35.0 URINE FREQUENCY: ICD-10-CM

## 2025-03-09 DIAGNOSIS — M62.82 NON-TRAUMATIC RHABDOMYOLYSIS: ICD-10-CM

## 2025-03-09 DIAGNOSIS — Z96.0 RETAINED URETERAL STENT: ICD-10-CM

## 2025-03-09 DIAGNOSIS — R41.82 ALTERED MENTAL STATUS, UNSPECIFIED ALTERED MENTAL STATUS TYPE: ICD-10-CM

## 2025-03-09 DIAGNOSIS — N17.9 AKI (ACUTE KIDNEY INJURY): ICD-10-CM

## 2025-03-09 DIAGNOSIS — N20.0 RENAL CALCULI: ICD-10-CM

## 2025-03-09 DIAGNOSIS — N39.0 URINARY TRACT INFECTION WITH HEMATURIA, SITE UNSPECIFIED: Primary | ICD-10-CM

## 2025-03-09 DIAGNOSIS — E87.1 HYPONATREMIA: ICD-10-CM

## 2025-03-09 DIAGNOSIS — R31.9 URINARY TRACT INFECTION WITH HEMATURIA, SITE UNSPECIFIED: Primary | ICD-10-CM

## 2025-03-09 LAB
ADV 40+41 DNA STL QL NAA+NON-PROBE: NOT DETECTED
ALBUMIN SERPL-MCNC: 3.5 G/DL (ref 3.5–5.2)
ALLENS TEST: ABNORMAL
ALP SERPL-CCNC: 68 U/L (ref 40–129)
ALT SERPL-CCNC: 10 U/L (ref 10–50)
AMPHET UR QL SCN: NEGATIVE
ANION GAP SERPL CALCULATED.3IONS-SCNC: 12 MMOL/L (ref 8–16)
AST SERPL-CCNC: 37 U/L (ref 10–50)
B PARAP IS1001 DNA NPH QL NAA+NON-PROBE: NOT DETECTED
B PERT.PT PRMT NPH QL NAA+NON-PROBE: NOT DETECTED
BACTERIA #/AREA URNS HPF: ABNORMAL /HPF
BARBITURATES UR QL SCN: NEGATIVE
BASE EXCESS ARTERIAL: -4.1 MMOL/L (ref -2–2)
BASOPHILS # BLD: 0 K/UL (ref 0–0.2)
BASOPHILS NFR BLD: 0.2 % (ref 0–1)
BENZODIAZ UR QL SCN: NEGATIVE
BILIRUB SERPL-MCNC: 1.4 MG/DL (ref 0.2–1.2)
BILIRUB UR STRIP.AUTO-MCNC: ABNORMAL MG/DL
BUN SERPL-MCNC: 23 MG/DL (ref 6–20)
BUPRENORPHINE URINE: NEGATIVE
C CAYETANENSIS DNA STL QL NAA+NON-PROBE: NOT DETECTED
C COLI+JEJ+UPSA DNA STL QL NAA+NON-PROBE: NOT DETECTED
C PNEUM DNA NPH QL NAA+NON-PROBE: NOT DETECTED
CALCIUM SERPL-MCNC: 8.2 MG/DL (ref 8.6–10)
CANNABINOIDS UR QL SCN: NEGATIVE
CARBOXYHEMOGLOBIN ARTERIAL: 1.1 % (ref 0–5)
CHLORIDE SERPL-SCNC: 97 MMOL/L (ref 98–107)
CK SERPL-CCNC: 1976 U/L (ref 39–308)
CLARITY UR: ABNORMAL
CO2 SERPL-SCNC: 17 MMOL/L (ref 22–29)
COCAINE UR QL SCN: NEGATIVE
COLOR UR: YELLOW
CREAT SERPL-MCNC: 1.5 MG/DL (ref 0.7–1.2)
CRYPTOSP DNA STL QL NAA+NON-PROBE: NOT DETECTED
DRUG SCREEN COMMENT UR-IMP: NORMAL
E HISTOLYT DNA STL QL NAA+NON-PROBE: NOT DETECTED
EAEC PAA PLAS AGGR+AATA ST NAA+NON-PRB: NOT DETECTED
EC STX1+STX2 GENES STL QL NAA+NON-PROBE: NOT DETECTED
EOSINOPHIL # BLD: 0 K/UL (ref 0–0.6)
EOSINOPHIL NFR BLD: 0 % (ref 0–5)
EPEC EAE GENE STL QL NAA+NON-PROBE: NOT DETECTED
ERYTHROCYTE [DISTWIDTH] IN BLOOD BY AUTOMATED COUNT: 14.3 % (ref 11.5–14.5)
ETEC LTA+ST1A+ST1B TOX ST NAA+NON-PROBE: NOT DETECTED
FENTANYL SCREEN, URINE: NEGATIVE
FIO2: 21 %
FLUAV RNA NPH QL NAA+NON-PROBE: NOT DETECTED
FLUBV RNA NPH QL NAA+NON-PROBE: NOT DETECTED
G LAMBLIA DNA STL QL NAA+NON-PROBE: NOT DETECTED
GI PATH DNA+RNA PNL STL NAA+NON-PROBE: NOT DETECTED
GLUCOSE SERPL-MCNC: 204 MG/DL (ref 70–99)
GLUCOSE UR STRIP.AUTO-MCNC: NEGATIVE MG/DL
HADV DNA NPH QL NAA+NON-PROBE: NOT DETECTED
HCO3 ARTERIAL: 19.8 MMOL/L (ref 22–26)
HCOV 229E RNA NPH QL NAA+NON-PROBE: NOT DETECTED
HCOV HKU1 RNA NPH QL NAA+NON-PROBE: NOT DETECTED
HCOV NL63 RNA NPH QL NAA+NON-PROBE: NOT DETECTED
HCOV OC43 RNA NPH QL NAA+NON-PROBE: NOT DETECTED
HCT VFR BLD AUTO: 40.4 % (ref 42–52)
HEMOGLOBIN, ART, EXTENDED: 13.3 G/DL (ref 14–18)
HGB BLD-MCNC: 13.7 G/DL (ref 14–18)
HGB UR STRIP.AUTO-MCNC: ABNORMAL MG/L
HMPV RNA NPH QL NAA+NON-PROBE: NOT DETECTED
HPIV1 RNA NPH QL NAA+NON-PROBE: NOT DETECTED
HPIV2 RNA NPH QL NAA+NON-PROBE: NOT DETECTED
HPIV3 RNA NPH QL NAA+NON-PROBE: NOT DETECTED
HPIV4 RNA NPH QL NAA+NON-PROBE: NOT DETECTED
IMM GRANULOCYTES # BLD: 0.2 K/UL
KETONES UR STRIP.AUTO-MCNC: ABNORMAL MG/DL
LACTATE BLDV-SCNC: 1.2 MMOL/L (ref 0.5–1.9)
LEUKOCYTE ESTERASE UR QL STRIP.AUTO: ABNORMAL
LYMPHOCYTES # BLD: 0.5 K/UL (ref 1.1–4.5)
LYMPHOCYTES NFR BLD: 2.3 % (ref 20–40)
M PNEUMO DNA NPH QL NAA+NON-PROBE: NOT DETECTED
MCH RBC QN AUTO: 32.8 PG (ref 27–31)
MCHC RBC AUTO-ENTMCNC: 33.9 G/DL (ref 33–37)
MCV RBC AUTO: 96.7 FL (ref 80–94)
METHADONE UR QL SCN: NEGATIVE
METHAMPHETAMINE, URINE: NEGATIVE
METHEMOGLOBIN ARTERIAL: 0.1 %
MONOCYTES # BLD: 1.8 K/UL (ref 0–0.9)
MONOCYTES NFR BLD: 8.4 % (ref 0–10)
MUCOUS THREADS URNS QL MICRO: ABNORMAL /LPF
NEUTROPHILS # BLD: 18.5 K/UL (ref 1.5–7.5)
NEUTS SEG NFR BLD: 88 % (ref 50–65)
NITRITE UR QL STRIP.AUTO: NEGATIVE
NOROVIRUS GI+II RNA STL QL NAA+NON-PROBE: NOT DETECTED
O2 CONTENT ARTERIAL: 17.7 ML/DL
O2 SAT, ARTERIAL: 94.3 %
O2 THERAPY: ABNORMAL
OPIATES UR QL SCN: NEGATIVE
OXYCODONE UR QL SCN: NEGATIVE
P SHIGELLOIDES DNA STL QL NAA+NON-PROBE: NOT DETECTED
PCO2 ARTERIAL: 32 MMHG (ref 35–45)
PCP UR QL SCN: NEGATIVE
PH ARTERIAL: 7.4 (ref 7.35–7.45)
PH UR STRIP.AUTO: >=9 [PH] (ref 5–8)
PLATELET # BLD AUTO: 194 K/UL (ref 130–400)
PMV BLD AUTO: 10.4 FL (ref 9.4–12.4)
PO2 ARTERIAL: 78 MMHG (ref 80–100)
POTASSIUM BLD-SCNC: 3.9 MMOL/L
POTASSIUM SERPL-SCNC: 4 MMOL/L (ref 3.5–5.1)
PROT SERPL-MCNC: 6.5 G/DL (ref 6.4–8.3)
PROT UR STRIP.AUTO-MCNC: >=300 MG/DL
RBC # BLD AUTO: 4.18 M/UL (ref 4.7–6.1)
RBC #/AREA URNS HPF: ABNORMAL /HPF (ref 0–2)
REASON FOR REJECTION: NORMAL
REASON FOR REJECTION: NORMAL
REJECTED TEST: NORMAL
REJECTED TEST: NORMAL
RSV RNA NPH QL NAA+NON-PROBE: NOT DETECTED
RV+EV RNA NPH QL NAA+NON-PROBE: NOT DETECTED
RVA RNA STL QL NAA+NON-PROBE: NOT DETECTED
S ENT+BONG DNA STL QL NAA+NON-PROBE: NOT DETECTED
SAMPLE SOURCE: ABNORMAL
SAPO I+II+IV+V RNA STL QL NAA+NON-PROBE: NOT DETECTED
SARS-COV-2 RNA NPH QL NAA+NON-PROBE: NOT DETECTED
SHIGELLA SP+EIEC IPAH ST NAA+NON-PROBE: NOT DETECTED
SODIUM SERPL-SCNC: 126 MMOL/L (ref 136–145)
SP GR UR STRIP.AUTO: 1.01 (ref 1–1.03)
SPONT RATE(BPM): 16
SQUAMOUS #/AREA URNS HPF: ABNORMAL /HPF
TRICYCLIC ANTIDEPRESSANTS, UR: NEGATIVE
TROPONIN, HIGH SENSITIVITY: 17 NG/L (ref 0–22)
UROBILINOGEN UR STRIP.AUTO-MCNC: 1 E.U./DL
V CHOL+PARA+VUL DNA STL QL NAA+NON-PROBE: NOT DETECTED
V CHOLERAE DNA STL QL NAA+NON-PROBE: NOT DETECTED
WBC # BLD AUTO: 21.1 K/UL (ref 4.8–10.8)
WBC #/AREA URNS HPF: ABNORMAL /HPF (ref 0–5)
Y ENTEROCOL DNA STL QL NAA+NON-PROBE: NOT DETECTED

## 2025-03-09 PROCEDURE — 6360000004 HC RX CONTRAST MEDICATION: Performed by: UROLOGY

## 2025-03-09 PROCEDURE — 85025 COMPLETE CBC W/AUTO DIFF WBC: CPT

## 2025-03-09 PROCEDURE — 99285 EMERGENCY DEPT VISIT HI MDM: CPT

## 2025-03-09 PROCEDURE — 7100000001 HC PACU RECOVERY - ADDTL 15 MIN: Performed by: UROLOGY

## 2025-03-09 PROCEDURE — 82550 ASSAY OF CK (CPK): CPT

## 2025-03-09 PROCEDURE — 93005 ELECTROCARDIOGRAM TRACING: CPT | Performed by: EMERGENCY MEDICINE

## 2025-03-09 PROCEDURE — 2580000003 HC RX 258

## 2025-03-09 PROCEDURE — 3600000014 HC SURGERY LEVEL 4 ADDTL 15MIN: Performed by: UROLOGY

## 2025-03-09 PROCEDURE — 3700000001 HC ADD 15 MINUTES (ANESTHESIA): Performed by: UROLOGY

## 2025-03-09 PROCEDURE — 74150 CT ABDOMEN W/O CONTRAST: CPT

## 2025-03-09 PROCEDURE — 6360000002 HC RX W HCPCS: Performed by: EMERGENCY MEDICINE

## 2025-03-09 PROCEDURE — 0202U NFCT DS 22 TRGT SARS-COV-2: CPT

## 2025-03-09 PROCEDURE — C2617 STENT, NON-COR, TEM W/O DEL: HCPCS | Performed by: UROLOGY

## 2025-03-09 PROCEDURE — 87077 CULTURE AEROBIC IDENTIFY: CPT

## 2025-03-09 PROCEDURE — 0T768DZ DILATION OF RIGHT URETER WITH INTRALUMINAL DEVICE, VIA NATURAL OR ARTIFICIAL OPENING ENDOSCOPIC: ICD-10-PCS | Performed by: UROLOGY

## 2025-03-09 PROCEDURE — C1769 GUIDE WIRE: HCPCS | Performed by: UROLOGY

## 2025-03-09 PROCEDURE — 80307 DRUG TEST PRSMV CHEM ANLYZR: CPT

## 2025-03-09 PROCEDURE — 87186 SC STD MICRODIL/AGAR DIL: CPT

## 2025-03-09 PROCEDURE — 36600 WITHDRAWAL OF ARTERIAL BLOOD: CPT

## 2025-03-09 PROCEDURE — 2580000003 HC RX 258: Performed by: EMERGENCY MEDICINE

## 2025-03-09 PROCEDURE — 87086 URINE CULTURE/COLONY COUNT: CPT

## 2025-03-09 PROCEDURE — 3700000000 HC ANESTHESIA ATTENDED CARE: Performed by: UROLOGY

## 2025-03-09 PROCEDURE — 7100000000 HC PACU RECOVERY - FIRST 15 MIN: Performed by: UROLOGY

## 2025-03-09 PROCEDURE — 87040 BLOOD CULTURE FOR BACTERIA: CPT

## 2025-03-09 PROCEDURE — 1200000000 HC SEMI PRIVATE

## 2025-03-09 PROCEDURE — 3600000004 HC SURGERY LEVEL 4 BASE: Performed by: UROLOGY

## 2025-03-09 PROCEDURE — 96374 THER/PROPH/DIAG INJ IV PUSH: CPT

## 2025-03-09 PROCEDURE — 2709999900 HC NON-CHARGEABLE SUPPLY: Performed by: UROLOGY

## 2025-03-09 PROCEDURE — 6370000000 HC RX 637 (ALT 250 FOR IP): Performed by: UROLOGY

## 2025-03-09 PROCEDURE — 6360000002 HC RX W HCPCS: Performed by: NURSE ANESTHETIST, CERTIFIED REGISTERED

## 2025-03-09 PROCEDURE — G0480 DRUG TEST DEF 1-7 CLASSES: HCPCS

## 2025-03-09 PROCEDURE — 2580000003 HC RX 258: Performed by: NURSE ANESTHETIST, CERTIFIED REGISTERED

## 2025-03-09 PROCEDURE — 83605 ASSAY OF LACTIC ACID: CPT

## 2025-03-09 PROCEDURE — 36415 COLL VENOUS BLD VENIPUNCTURE: CPT

## 2025-03-09 PROCEDURE — 87150 DNA/RNA AMPLIFIED PROBE: CPT

## 2025-03-09 PROCEDURE — 87507 IADNA-DNA/RNA PROBE TQ 12-25: CPT

## 2025-03-09 PROCEDURE — 81001 URINALYSIS AUTO W/SCOPE: CPT

## 2025-03-09 PROCEDURE — 84484 ASSAY OF TROPONIN QUANT: CPT

## 2025-03-09 PROCEDURE — BT141ZZ FLUOROSCOPY OF KIDNEYS, URETERS AND BLADDER USING LOW OSMOLAR CONTRAST: ICD-10-PCS | Performed by: UROLOGY

## 2025-03-09 PROCEDURE — 6360000002 HC RX W HCPCS: Performed by: UROLOGY

## 2025-03-09 PROCEDURE — 80053 COMPREHEN METABOLIC PANEL: CPT

## 2025-03-09 PROCEDURE — 71045 X-RAY EXAM CHEST 1 VIEW: CPT

## 2025-03-09 PROCEDURE — 82803 BLOOD GASES ANY COMBINATION: CPT

## 2025-03-09 PROCEDURE — 6370000000 HC RX 637 (ALT 250 FOR IP): Performed by: EMERGENCY MEDICINE

## 2025-03-09 PROCEDURE — 2500000003 HC RX 250 WO HCPCS: Performed by: EMERGENCY MEDICINE

## 2025-03-09 PROCEDURE — 70450 CT HEAD/BRAIN W/O DYE: CPT

## 2025-03-09 PROCEDURE — C1758 CATHETER, URETERAL: HCPCS | Performed by: UROLOGY

## 2025-03-09 DEVICE — URETERAL STENT WITH SIDE HOLES 6FX28CM
Type: IMPLANTABLE DEVICE | Status: FUNCTIONAL
Brand: TRIA™ SOFT

## 2025-03-09 RX ORDER — ACETAMINOPHEN 325 MG/1
650 TABLET ORAL EVERY 6 HOURS PRN
Status: DISCONTINUED | OUTPATIENT
Start: 2025-03-09 | End: 2025-03-12 | Stop reason: HOSPADM

## 2025-03-09 RX ORDER — SODIUM CHLORIDE 9 MG/ML
INJECTION, SOLUTION INTRAVENOUS PRN
Status: DISCONTINUED | OUTPATIENT
Start: 2025-03-09 | End: 2025-03-12 | Stop reason: HOSPADM

## 2025-03-09 RX ORDER — TAMSULOSIN HYDROCHLORIDE 0.4 MG/1
0.4 CAPSULE ORAL DAILY
Status: DISCONTINUED | OUTPATIENT
Start: 2025-03-09 | End: 2025-03-12 | Stop reason: HOSPADM

## 2025-03-09 RX ORDER — FENTANYL CITRATE 50 UG/ML
INJECTION, SOLUTION INTRAMUSCULAR; INTRAVENOUS
Status: DISCONTINUED | OUTPATIENT
Start: 2025-03-09 | End: 2025-03-09 | Stop reason: SDUPTHER

## 2025-03-09 RX ORDER — SODIUM CHLORIDE 0.9 % (FLUSH) 0.9 %
5-40 SYRINGE (ML) INJECTION PRN
Status: DISCONTINUED | OUTPATIENT
Start: 2025-03-09 | End: 2025-03-12 | Stop reason: HOSPADM

## 2025-03-09 RX ORDER — HYDROMORPHONE HYDROCHLORIDE 1 MG/ML
1 INJECTION, SOLUTION INTRAMUSCULAR; INTRAVENOUS; SUBCUTANEOUS
Refills: 0 | Status: DISCONTINUED | OUTPATIENT
Start: 2025-03-09 | End: 2025-03-12

## 2025-03-09 RX ORDER — 0.9 % SODIUM CHLORIDE 0.9 %
2340 INTRAVENOUS SOLUTION INTRAVENOUS ONCE
Status: COMPLETED | OUTPATIENT
Start: 2025-03-09 | End: 2025-03-09

## 2025-03-09 RX ORDER — PROPOFOL 10 MG/ML
INJECTION, EMULSION INTRAVENOUS
Status: DISCONTINUED | OUTPATIENT
Start: 2025-03-09 | End: 2025-03-09 | Stop reason: SDUPTHER

## 2025-03-09 RX ORDER — ENOXAPARIN SODIUM 100 MG/ML
40 INJECTION SUBCUTANEOUS 2 TIMES DAILY
Status: DISCONTINUED | OUTPATIENT
Start: 2025-03-09 | End: 2025-03-10

## 2025-03-09 RX ORDER — POLYETHYLENE GLYCOL 3350 17 G/17G
17 POWDER, FOR SOLUTION ORAL DAILY PRN
Status: DISCONTINUED | OUTPATIENT
Start: 2025-03-09 | End: 2025-03-12 | Stop reason: HOSPADM

## 2025-03-09 RX ORDER — SODIUM CHLORIDE, SODIUM LACTATE, POTASSIUM CHLORIDE, CALCIUM CHLORIDE 600; 310; 30; 20 MG/100ML; MG/100ML; MG/100ML; MG/100ML
INJECTION, SOLUTION INTRAVENOUS
Status: DISCONTINUED | OUTPATIENT
Start: 2025-03-09 | End: 2025-03-09 | Stop reason: SDUPTHER

## 2025-03-09 RX ORDER — LIDOCAINE HYDROCHLORIDE 10 MG/ML
INJECTION, SOLUTION INFILTRATION; PERINEURAL
Status: DISCONTINUED | OUTPATIENT
Start: 2025-03-09 | End: 2025-03-09 | Stop reason: SDUPTHER

## 2025-03-09 RX ORDER — ONDANSETRON 4 MG/1
4 TABLET, ORALLY DISINTEGRATING ORAL EVERY 8 HOURS PRN
Status: DISCONTINUED | OUTPATIENT
Start: 2025-03-09 | End: 2025-03-12 | Stop reason: HOSPADM

## 2025-03-09 RX ORDER — ACETAMINOPHEN 500 MG
1000 TABLET ORAL ONCE
Status: COMPLETED | OUTPATIENT
Start: 2025-03-09 | End: 2025-03-09

## 2025-03-09 RX ORDER — SODIUM CHLORIDE 0.9 % (FLUSH) 0.9 %
5-40 SYRINGE (ML) INJECTION EVERY 12 HOURS SCHEDULED
Status: DISCONTINUED | OUTPATIENT
Start: 2025-03-09 | End: 2025-03-12 | Stop reason: HOSPADM

## 2025-03-09 RX ORDER — 0.9 % SODIUM CHLORIDE 0.9 %
1000 INTRAVENOUS SOLUTION INTRAVENOUS ONCE
Status: DISCONTINUED | OUTPATIENT
Start: 2025-03-09 | End: 2025-03-09

## 2025-03-09 RX ORDER — IOPAMIDOL 612 MG/ML
INJECTION, SOLUTION INTRAVASCULAR PRN
Status: DISCONTINUED | OUTPATIENT
Start: 2025-03-09 | End: 2025-03-09 | Stop reason: HOSPADM

## 2025-03-09 RX ORDER — 0.9 % SODIUM CHLORIDE 0.9 %
30 INTRAVENOUS SOLUTION INTRAVENOUS ONCE
Status: DISCONTINUED | OUTPATIENT
Start: 2025-03-09 | End: 2025-03-09

## 2025-03-09 RX ORDER — SODIUM CHLORIDE 9 MG/ML
INJECTION, SOLUTION INTRAVENOUS CONTINUOUS
Status: DISCONTINUED | OUTPATIENT
Start: 2025-03-09 | End: 2025-03-10

## 2025-03-09 RX ORDER — ONDANSETRON 2 MG/ML
INJECTION INTRAMUSCULAR; INTRAVENOUS
Status: DISCONTINUED | OUTPATIENT
Start: 2025-03-09 | End: 2025-03-09 | Stop reason: SDUPTHER

## 2025-03-09 RX ORDER — ACETAMINOPHEN 650 MG/1
650 SUPPOSITORY RECTAL EVERY 6 HOURS PRN
Status: DISCONTINUED | OUTPATIENT
Start: 2025-03-09 | End: 2025-03-12 | Stop reason: HOSPADM

## 2025-03-09 RX ORDER — ONDANSETRON 2 MG/ML
4 INJECTION INTRAMUSCULAR; INTRAVENOUS EVERY 6 HOURS PRN
Status: DISCONTINUED | OUTPATIENT
Start: 2025-03-09 | End: 2025-03-12 | Stop reason: HOSPADM

## 2025-03-09 RX ADMIN — FENTANYL CITRATE 50 MCG: 0.05 INJECTION, SOLUTION INTRAMUSCULAR; INTRAVENOUS at 19:20

## 2025-03-09 RX ADMIN — FENTANYL CITRATE 50 MCG: 0.05 INJECTION, SOLUTION INTRAMUSCULAR; INTRAVENOUS at 19:29

## 2025-03-09 RX ADMIN — PROPOFOL 200 MG: 10 INJECTION, EMULSION INTRAVENOUS at 19:15

## 2025-03-09 RX ADMIN — SODIUM CHLORIDE: 9 INJECTION, SOLUTION INTRAVENOUS at 18:12

## 2025-03-09 RX ADMIN — ACETAMINOPHEN 1000 MG: 500 TABLET ORAL at 12:22

## 2025-03-09 RX ADMIN — HYDROMORPHONE HYDROCHLORIDE 1 MG: 1 INJECTION, SOLUTION INTRAMUSCULAR; INTRAVENOUS; SUBCUTANEOUS at 21:48

## 2025-03-09 RX ADMIN — TAMSULOSIN HYDROCHLORIDE 0.4 MG: 0.4 CAPSULE ORAL at 21:49

## 2025-03-09 RX ADMIN — SODIUM CHLORIDE 2340 ML: 9 INJECTION, SOLUTION INTRAVENOUS at 12:21

## 2025-03-09 RX ADMIN — LIDOCAINE HYDROCHLORIDE 50 MG: 10 INJECTION, SOLUTION INFILTRATION; PERINEURAL at 19:15

## 2025-03-09 RX ADMIN — WATER 1000 MG: 1 INJECTION INTRAMUSCULAR; INTRAVENOUS; SUBCUTANEOUS at 12:41

## 2025-03-09 RX ADMIN — SODIUM CHLORIDE, SODIUM LACTATE, POTASSIUM CHLORIDE, AND CALCIUM CHLORIDE: 600; 310; 30; 20 INJECTION, SOLUTION INTRAVENOUS at 19:10

## 2025-03-09 RX ADMIN — ONDANSETRON 4 MG: 2 INJECTION INTRAMUSCULAR; INTRAVENOUS at 19:37

## 2025-03-09 RX ADMIN — ENOXAPARIN SODIUM 40 MG: 100 INJECTION SUBCUTANEOUS at 21:49

## 2025-03-09 ASSESSMENT — PAIN DESCRIPTION - LOCATION: LOCATION: FLANK

## 2025-03-09 ASSESSMENT — PAIN SCALES - GENERAL
PAINLEVEL_OUTOF10: 10
PAINLEVEL_OUTOF10: 8

## 2025-03-09 ASSESSMENT — PAIN DESCRIPTION - ORIENTATION: ORIENTATION: RIGHT

## 2025-03-09 ASSESSMENT — PAIN DESCRIPTION - DESCRIPTORS: DESCRIPTORS: ACHING

## 2025-03-09 NOTE — CONSULTS
Hemoglobin/Hematocrit:    Lab Results   Component Value Date/Time    HGB 13.7 03/09/2025 11:57 AM    HCT 40.4 03/09/2025 11:57 AM     BMP:    Lab Results   Component Value Date/Time     03/09/2025 01:31 PM    K 3.9 03/09/2025 04:50 PM    K 4.0 03/09/2025 01:31 PM    CL 97 03/09/2025 01:31 PM    CO2 17 03/09/2025 01:31 PM    BUN 23 03/09/2025 01:31 PM    CREATININE 1.5 03/09/2025 01:31 PM    CALCIUM 8.2 03/09/2025 01:31 PM    LABGLOM 53 03/09/2025 01:31 PM       Urine Culture: pending    Blood Culture: pending    Antibiotic Therapy: zosyn    Imaging: CT KIDNEY WO CONTRAST [[id number]]  Status: Final result     PACS Images     Show images for CT KIDNEY WO CONTRAST  CT KIDNEY WO CONTRAST  Order: [id number]   Status: Final result       Next appt: None    Test Result Released: No    0 Result Notes  Details    Reading Physician Reading Date Result Priority   Humberto Garza MD  [phone]     3/9/2025      Narrative & Impression  EXAM: CT ABDOMEN AND PELVIS WITHOUT CONTRAST     HISTORY: Hematuria.  Abdominal and pelvic pain.     TECHNIQUE: CT acquisition of the abdomen and pelvis from the lower thorax through the pelvis without IV contrast administration.  2-D coronal and sagittal reformatted images were obtained from the axial source images.  Oral Contrast: None.  CT Dose Reduction Techniques Performed: Yes.     COMPARISON: None.     FINDINGS:  Lower Thorax: Within normal limits.  Liver: No mass. Normal morphology.  Biliary: The gallbladder is surgically absent with clips noted in the gallbladder bed. The bile ducts are normal.  Pancreas: No mass or evidence of pancreatitis. No duct dilation.  Spleen: No mass. No splenomegaly.  Adrenals: No mass.  Kidneys/Ureters: No renal mass.  Nonobstructing right renal calculi measuring up to 0.8 cm.  No left renal calculus.  Round region of faintly increased attenuation in the right ureteropelvic junction which may indicate partially obstructing calculus.    Mild right  hydronephrosis.  No left hydronephrosis.  No ureteral calculus.  GI Tract: No bowel dilation.  No bowel wall thickening. Prior gastric surgery.  Peritoneal Cavity: No ascites.  Retroperitoneum: No fluid collection.  Lymph Nodes: No lymphadenopathy.  Vasculature: No aortic calcifications.  No aortic or iliac aneurysm within limitations of noncontrast examination.  Pelvis: No mass.  Bladder is normal.  Bones/Soft Tissues: No fracture or lytic lesion.  Degenerative changes of the spine.  Visualized abdominal wall soft tissues are normal.     IMPRESSION:  1.  Nonobstructing right renal calculi measuring up to 0.8 cm.  2.  Round region of faintly increased attenuation in the right UPJ which may indicate partially obstructing calculus.  Mild right hydronephrosis.  3.  No ureteral calculus.  4.  Previous cholecystectomy.  5.  Prior gastric surgery.  6.  Degenerative changes of the spine.  7.  Otherwise unremarkable noncontrast CT scan of the abdomen and pelvis.      All CT scans are performed using dose optimization techniques as appropriate to the performed exam and include   at least one of the following: Automated exposure control, adjustment of the mA and/or kV according to size, and the use of iterative reconstruction technique.      ______________________________________   Electronically signed by: SHANTANU COLLADO M.D.  Date:     03/09/2025  Time:    14:14         Exam Ended: 03/09/25 13:27 CDT Last Resulted: 03/09/25 14:26 CDT     I reviewed the above images personally. Multiple renal stones, with 8mm right UPJ stone with mild hydronephrosis and perinephric stranding. Normal left kidney.  Bladder unremaStatus: Final result       Next appt: None    Test Result Released: No    Specimen Information: Urine, straight catheter   0 Result Notes      Component  Ref Range & Units (hover) 3/9/25 1214   Color, UA YELLOW   Clarity, UA CLOUDY Abnormal    Glucose, Ur NEGATIVE   Bilirubin, Urine SMALL Abnormal    Ketones, Urine TRACE  Abnormal    Specific Gravity, UA 1.010   Blood, Urine LARGE Abnormal    pH, Urine >=9.0 Abnormal    Protein, UA >=300 Abnormal    Urobilinogen, Urine 1.0   Nitrite, Urine Negative   Leukocyte Esterase, Urine MODERATE Abnormal       rkable.     Comprehensive Metabolic Panel  Order: [id number]   Status: Final result       Next appt: None    Test Result Released: No    0 Result Notes       1 HM Topic      Component  Ref Range & Units (hover) 3/9/25 1331   Sodium 126 Low    Potassium 4.0   Chloride 97 Low    CO2 17 Low    Anion Gap 12   Glucose 204 High    BUN 23 High    Creatinine 1.5 High    Est, Glom Filt Rate 53 Abnormal         Impression/Plan:   60 yo with BMI of 51 admitted through ED with history of dizziness. Has right UPJ stone with hydronephrosis and perinephric stranding. Multiple renal stones right side. Normal left kidney. Has features of UTI with WCC 21, elevated creatinine, PRAMOD likely from dehydration and renal obstruction.   Discussed management options with patient and sister in law. Recommended cystoscopy and right ureteral stenting urgent. PCN would be challenging because of his BMI and mild hydronephrosis. Risks and complications of stenting including sepsis, hematuria, stent complications including retained stent, retention explained. Informed need to follow up for definitive stone surgery in the near future.   Also informed need 24 hour urine stone risk evaluation in the future to see for hyperoxaluria as possible cause in view of bariatric surgery history.     History of CKD - need nephrology consult.    Speedy Garvin MD

## 2025-03-09 NOTE — H&P
[facility]      Hospitalist - History & Physical      PCP: Julio Cesar Mosqueda MD    Date of Admission: 3/9/2025    Date of Service: 3/9/2025    Chief Complaint: Fall    History Of Present Illness:   The patient is a 59 y.o. male with hypertension comes to ED for evaluation following a fall.  Apparently, family was at bedside earlier and states that he is also altered from his baseline and was \"acting like a 12-year-old.\"  On my assessment, patient is very lethargic and it is difficult to get him to answer my questions.  I am unsure if this is simply because he does not want to or if he is becoming more altered.  Patient does state that he fell but cannot tell me why he fell.  He reports that he was not dizzy and it was not a mechanical fall.  He did not hit his head.  According to ED report, patient was also incontinent of stool. Patient does report urinary frequency.  Patient denies abdominal pain, dysuria, hematuria, chest pain, shortness of breath, fevers.    In ED: CXR with no acute intrathoracic findings; CT head with no acute intracranial process; CT kidney shows nonobstructing right renal calculi measuring up to 0.8 cm, round region of faintly increased attenuation in the right UPJ which may indicate partially obstructing calculus, mild right hydronephrosis, no ureteral calculus; UDS negative; UA with moderate LE, negative nitrite, 3-5 WBC, 3+ bacteria, PCR panel negative, GI panel negative, total CK1 976, creatinine 1.5, BUN 23, GFR 53, sodium 126.  Patient will be admitted inpatient to hospitalist with consult to urology.    Past Medical History:        Diagnosis Date    Hypertension        Past Surgical History:    History reviewed. No pertinent surgical history.    Home Medications:  Prior to Admission medications    Medication Sig Start Date End Date Taking? Authorizing Provider   LISINOPRIL PO Take by mouth Hasn't taken it in a while   Yes Provider, MD Santana       Allergies:    Patient has  of the mA and/or kV according to size, and the use of iterative reconstruction technique.  ______________________________________ Electronically signed by: GISELA LUJAN M.D. Date:     03/09/2025 Time:    14:13     XR CHEST PORTABLE  Result Date: 3/9/2025  EXAM: SINGLE VIEW CHEST XRAY.  Date: March 9, 2025  Comparison: None  History: Fever  Findings:  No acute osseous abnormality. The lungs are clear . The cardiac silhouette is within normal limits. The pulmonary vasculature is within normal limits.  Impresson: No acute intrathoracic findings.   ______________________________________ Electronically signed by: THO MAIER M.D. Date:     03/09/2025 Time:    13:01       Assessment/Plan:  Principal Problem:    Urinary tract infection  Active Problems:    Altered mental status    Renal calculi  Resolved Problems:    * No resolved hospital problems. *   Principal Problem:    Urinary tract infection  - Rocephin 1,000 mg IV q24h              - IVF NS @ 75 cc/hr              - Zofran prn for nausea/vomiting              - CBC w/ diff, BMP w/ reflex to mag daily              - Diet NPO pending urology eval              - Monitor I/O's              - Daily weights              - Telemetry    Active Problems:    Altered mental status   - UDS r/o substance   - ABG due to obesity with lethargy, consider hypercapnia       Renal calculi   - Per CT report   - Consult to urology    Resolved Problems:    * No resolved hospital problems. *    DVT prophylaxis: Lovenox 40 mg SQ BID  GI prophylaxis: not indicated    Signed:  CHRISTINE Villavicencio - CNP, 3/9/2025 3:24 PM    EMR Dragon/Transcription disclaimer:   Much of this encounter note is an electronic transcription/translation of spoken language to printed text. The electronic translation of spoken language may permit erroneous, or at times, nonsensical words or phrases to be inadvertently transcribed; although attempts have made to review the note for such errors, some may still  exist.

## 2025-03-09 NOTE — ED NOTES
ED TO INPATIENT SBAR HANDOFF    Patient Name: Eldon Nichole   : 1965  59 y.o.   Family/Caregiver Present: No  Code Status Order: No Order    C-SSRS: Risk of Suicide: No Risk  Sitter No  Restraints:         Situation  Chief Complaint:   Chief Complaint   Patient presents with    Fall     Fall into floor today d/t weakness and could not get up; denies pain; arrives soiled with stool and urine    Altered Mental Status     EMS reports pt altered and \"acting like a 12 year old\"     Patient Diagnosis: Urinary tract infection [N39.0]     Brief Description of Patient's Condition: pt came from home with complaints of a fall this morning while trying to go to the bathroom, EMS reported that the pt was altered with them, he has not AMS in the ER, pts family reports that it is difficult for them to take care of him, pt denies any pain at this time  Mental Status: oriented, alert, coherent, logical, thought processes intact, and able to concentrate and follow conversation  Arrived from: home    Imaging:   CT KIDNEY WO CONTRAST   Final Result   1.  Nonobstructing right renal calculi measuring up to 0.8 cm.   2.  Round region of faintly increased attenuation in the right UPJ which may indicate partially obstructing calculus.  Mild right hydronephrosis.   3.  No ureteral calculus.   4.  Previous cholecystectomy.   5.  Prior gastric surgery.   6.  Degenerative changes of the spine.   7.  Otherwise unremarkable noncontrast CT scan of the abdomen and pelvis.        All CT scans are performed using dose optimization techniques as appropriate to the performed exam and include    at least one of the following: Automated exposure control, adjustment of the mA and/or kV according to size, and the use of iterative reconstruction technique.        ______________________________________    Electronically signed by: SHANTANU COLLADO M.D.   Date:     2025   Time:    14:14       CT HEAD WO CONTRAST   Final Result   No acute intracranial  process.        All CT scans are performed using dose optimization techniques as appropriate to the performed exam and include    at least one of the following: Automated exposure control, adjustment of the mA and/or kV according to size, and the use of iterative reconstruction technique.        ______________________________________    Electronically signed by: GISELA LUJAN M.D.   Date:     03/09/2025   Time:    14:13       XR CHEST PORTABLE   Final Result        COVID-19 Results:   Internal Administration   First Dose      Second Dose           Last COVID Lab SARS-CoV-2, PCR (no units)   Date Value   03/09/2025 Not Detected           Abnormal labs:   Abnormal Labs Reviewed   CBC WITH AUTO DIFFERENTIAL - Abnormal; Notable for the following components:       Result Value    WBC 21.1 (*)     RBC 4.18 (*)     Hemoglobin 13.7 (*)     Hematocrit 40.4 (*)     MCV 96.7 (*)     MCH 32.8 (*)     Neutrophils % 88.0 (*)     Lymphocytes % 2.3 (*)     Neutrophils Absolute 18.5 (*)     Lymphocytes Absolute 0.5 (*)     Monocytes Absolute 1.80 (*)     All other components within normal limits   URINALYSIS WITH REFLEX TO CULTURE - Abnormal; Notable for the following components:    Clarity, UA CLOUDY (*)     Bilirubin, Urine SMALL (*)     Ketones, Urine TRACE (*)     Blood, Urine LARGE (*)     pH, Urine >=9.0 (*)     Protein, UA >=300 (*)     Leukocyte Esterase, Urine MODERATE (*)     All other components within normal limits   MICROSCOPIC URINALYSIS - Abnormal; Notable for the following components:    Mucus, UA 2+ (*)     WBC, UA 3-5 (*)     All other components within normal limits   COMPREHENSIVE METABOLIC PANEL - Abnormal; Notable for the following components:    Sodium 126 (*)     Chloride 97 (*)     CO2 17 (*)     Glucose 204 (*)     BUN 23 (*)     Creatinine 1.5 (*)     Est, Glom Filt Rate 53 (*)     Calcium 8.2 (*)     Total Bilirubin 1.4 (*)     All other components within normal limits   CK - Abnormal; Notable for the

## 2025-03-09 NOTE — ED PROVIDER NOTES
[facility] 5 SURG SERVICES  eMERGENCY dEPARTMENT eNCOUnter      Pt Name: Eldon Nichole  MRN: [id number]  Birthdate 1965  Date of evaluation: 3/9/2025  Provider: Yasmin Simeon MD    CHIEF COMPLAINT       Chief Complaint   Patient presents with    Fall     Fall into floor today d/t weakness and could not get up; denies pain; arrives soiled with stool and urine    Altered Mental Status     EMS reports pt altered and \"acting like a 12 year old\"         HISTORY OF PRESENT ILLNESS   (Location/Symptom, Timing/Onset,Context/Setting, Quality, Duration, Modifying Factors, Severity)  Note limiting factors.   Eldon Nichole is a 59 y.o. male who presents to the emergency department after a fall.  The patient states he was going to go to the bathroom when he became weak and slid to the ground. He did not blackout or lose consciousness.  He did not hit his head.  He denies any neck or back pain.  There were no injuries.  He was on the ground for about 2 hours and unable to get up.  Per EMS report the patient seemed confused.  He is currently awake alert and oriented x 3.  There was concern for a urinary tract infection.  The patient was incontinent of stool.  He said the diarrhea just started today.  He denies abdominal pain.  He has no dysuria or hematuria.  Previously healthy with only medical history of high blood pressure       HPI    NursingNotes were reviewed.    REVIEW OF SYSTEMS    (2-9 systems for level 4, 10 or more for level 5)     Review of Systems   Constitutional:  Positive for fatigue and fever. Negative for chills.   HENT:  Negative for rhinorrhea and sore throat.    Respiratory:  Negative for shortness of breath.    Cardiovascular:  Negative for chest pain and leg swelling.   Gastrointestinal:  Positive for diarrhea. Negative for abdominal pain, nausea and vomiting.   Genitourinary:  Negative for difficulty urinating.   Musculoskeletal:  Negative for back pain and neck pain.   Skin:  Negative for rash.   Neurological:

## 2025-03-10 LAB
A BAUMANNII DNA BLD POS QL NAA+NON-PROBE: NOT DETECTED
ANION GAP SERPL CALCULATED.3IONS-SCNC: 11 MMOL/L (ref 8–16)
BASOPHILS # BLD: 0 K/UL (ref 0–0.2)
BASOPHILS NFR BLD: 0.2 % (ref 0–1)
BLACTX-M ISLT/SPM QL: NOT DETECTED
BLAIMP ISLT/SPM QL: NOT DETECTED
BLAKPC ISLT/SPM QL: NOT DETECTED
BLAVIM ISLT/SPM QL: NOT DETECTED
BUN SERPL-MCNC: 34 MG/DL (ref 6–20)
C ALBICANS DNA BLD POS QL NAA+NON-PROBE: NOT DETECTED
C AURIS DNA BLD POS QL NAA+PROBE: NOT DETECTED
C GLABRATA DNA BLD POS QL NAA+NON-PROBE: NOT DETECTED
C KRUSEI DNA BLD POS QL NAA+NON-PROBE: NOT DETECTED
C PARAP DNA BLD POS QL NAA+NON-PROBE: NOT DETECTED
C TROPICLS DNA BLD POS QL NAA+NON-PROBE: NOT DETECTED
CALCIUM SERPL-MCNC: 7.8 MG/DL (ref 8.6–10)
CARBAPENEM RESISTANCE NDM GENE BY PCR: NOT DETECTED
CARBAPENEM RESISTANCE OXA-48 GENE BY PCR: NOT DETECTED
CHLORIDE SERPL-SCNC: 100 MMOL/L (ref 98–107)
CO2 SERPL-SCNC: 18 MMOL/L (ref 22–29)
CREAT SERPL-MCNC: 2.3 MG/DL (ref 0.7–1.2)
CRYPTOCOCCUS NEOFORMANS/GATTII BY PCR: NOT DETECTED
E CLOAC COMP DNA BLD POS NAA+NON-PROBE: NOT DETECTED
E COLI DNA BLD POS QL NAA+NON-PROBE: NOT DETECTED
E FAECALIS DNA BLD POS QL NAA+PROBE: NOT DETECTED
E FAECIUM DNA BLD POS QL NAA+PROBE: NOT DETECTED
ENTEROBACT DNA BLD POS QL NAA+NON-PROBE: DETECTED
ENTEROCOC DNA BLD POS QL NAA+NON-PROBE: NOT DETECTED
EOSINOPHIL # BLD: 0 K/UL (ref 0–0.6)
EOSINOPHIL NFR BLD: 0.1 % (ref 0–5)
ERYTHROCYTE [DISTWIDTH] IN BLOOD BY AUTOMATED COUNT: 14.7 % (ref 11.5–14.5)
GLUCOSE SERPL-MCNC: 167 MG/DL (ref 70–99)
GN BLD CULTURE PNL BLD POS NAA+PROBE: NOT DETECTED
GP B STREP DNA BLD POS QL NAA+NON-PROBE: NOT DETECTED
HCT VFR BLD AUTO: 37.3 % (ref 42–52)
HGB BLD-MCNC: 12.1 G/DL (ref 14–18)
IMM GRANULOCYTES # BLD: 0.1 K/UL
K OXYTOCA DNA BLD POS QL NAA+NON-PROBE: NOT DETECTED
K PNEUMON DNA SPEC QL NAA+PROBE: NOT DETECTED
K. AEROGENES DNA SPEC QL NAA+PROBE: NOT DETECTED
L MONOCYTOG DNA BLD POS QL NAA+NON-PROBE: NOT DETECTED
LYMPHOCYTES # BLD: 0.8 K/UL (ref 1.1–4.5)
LYMPHOCYTES NFR BLD: 4.8 % (ref 20–40)
MCH RBC QN AUTO: 33 PG (ref 27–31)
MCHC RBC AUTO-ENTMCNC: 32.4 G/DL (ref 33–37)
MCV RBC AUTO: 101.6 FL (ref 80–94)
MONOCYTES # BLD: 1.7 K/UL (ref 0–0.9)
MONOCYTES NFR BLD: 10.8 % (ref 0–10)
N MEN DNA BLD POS QL NAA+NON-PROBE: NOT DETECTED
NEUTROPHILS # BLD: 13.3 K/UL (ref 1.5–7.5)
NEUTS SEG NFR BLD: 83.4 % (ref 50–65)
P AERUGINOSA DNA BLD POS NAA+NON-PROBE: NOT DETECTED
PLATELET # BLD AUTO: 152 K/UL (ref 130–400)
PMV BLD AUTO: 10.5 FL (ref 9.4–12.4)
POTASSIUM SERPL-SCNC: 4.4 MMOL/L (ref 3.5–5)
PROTEUS SP DNA BLD POS QL NAA+NON-PROBE: DETECTED
RBC # BLD AUTO: 3.67 M/UL (ref 4.7–6.1)
S AUREUS DNA BLD POS QL NAA+NON-PROBE: NOT DETECTED
S AUREUS+CONS DNA BLD POS NAA+NON-PROBE: NOT DETECTED
S EPIDERMIDIS DNA BLD POS QL NAA+PROBE: NOT DETECTED
S LUGDUNENSIS DNA BLD POS QL NAA+PROBE: NOT DETECTED
S MALTOPH DNA BLD POS QL NAA+PROBE: NOT DETECTED
S MARCESCENS DNA BLD POS NAA+NON-PROBE: NOT DETECTED
S PNEUM DNA BLD POS QL NAA+NON-PROBE: NOT DETECTED
S PYO DNA BLD POS QL NAA+NON-PROBE: NOT DETECTED
SALMONELLA DNA BLD POS QL NAA+PROBE: NOT DETECTED
SODIUM SERPL-SCNC: 129 MMOL/L (ref 136–145)
STREPTOCOCCUS DNA BLD POS NAA+NON-PROBE: NOT DETECTED
WBC # BLD AUTO: 15.9 K/UL (ref 4.8–10.8)

## 2025-03-10 PROCEDURE — 2580000003 HC RX 258: Performed by: UROLOGY

## 2025-03-10 PROCEDURE — 87040 BLOOD CULTURE FOR BACTERIA: CPT

## 2025-03-10 PROCEDURE — 87077 CULTURE AEROBIC IDENTIFY: CPT

## 2025-03-10 PROCEDURE — 6360000002 HC RX W HCPCS: Performed by: UROLOGY

## 2025-03-10 PROCEDURE — 6370000000 HC RX 637 (ALT 250 FOR IP): Performed by: UROLOGY

## 2025-03-10 PROCEDURE — 94760 N-INVAS EAR/PLS OXIMETRY 1: CPT

## 2025-03-10 PROCEDURE — 2580000003 HC RX 258: Performed by: INTERNAL MEDICINE

## 2025-03-10 PROCEDURE — 87086 URINE CULTURE/COLONY COUNT: CPT

## 2025-03-10 PROCEDURE — 2500000003 HC RX 250 WO HCPCS: Performed by: INTERNAL MEDICINE

## 2025-03-10 PROCEDURE — 85025 COMPLETE CBC W/AUTO DIFF WBC: CPT

## 2025-03-10 PROCEDURE — 87150 DNA/RNA AMPLIFIED PROBE: CPT

## 2025-03-10 PROCEDURE — 36415 COLL VENOUS BLD VENIPUNCTURE: CPT

## 2025-03-10 PROCEDURE — 80048 BASIC METABOLIC PNL TOTAL CA: CPT

## 2025-03-10 PROCEDURE — 1200000000 HC SEMI PRIVATE

## 2025-03-10 RX ORDER — ENOXAPARIN SODIUM 100 MG/ML
60 INJECTION SUBCUTANEOUS 2 TIMES DAILY
Status: DISCONTINUED | OUTPATIENT
Start: 2025-03-10 | End: 2025-03-12 | Stop reason: HOSPADM

## 2025-03-10 RX ADMIN — ENOXAPARIN SODIUM 40 MG: 100 INJECTION SUBCUTANEOUS at 07:33

## 2025-03-10 RX ADMIN — PIPERACILLIN AND TAZOBACTAM 4500 MG: 4; .5 INJECTION, POWDER, LYOPHILIZED, FOR SOLUTION INTRAVENOUS at 02:09

## 2025-03-10 RX ADMIN — SODIUM BICARBONATE: 84 INJECTION, SOLUTION INTRAVENOUS at 10:59

## 2025-03-10 RX ADMIN — ENOXAPARIN SODIUM 60 MG: 100 INJECTION SUBCUTANEOUS at 21:00

## 2025-03-10 RX ADMIN — PIPERACILLIN AND TAZOBACTAM 4500 MG: 4; .5 INJECTION, POWDER, LYOPHILIZED, FOR SOLUTION INTRAVENOUS at 18:02

## 2025-03-10 RX ADMIN — PIPERACILLIN AND TAZOBACTAM 4500 MG: 4; .5 INJECTION, POWDER, LYOPHILIZED, FOR SOLUTION INTRAVENOUS at 09:28

## 2025-03-10 RX ADMIN — SODIUM CHLORIDE: 9 INJECTION, SOLUTION INTRAVENOUS at 02:08

## 2025-03-10 RX ADMIN — ACETAMINOPHEN 650 MG: 325 TABLET ORAL at 16:56

## 2025-03-10 RX ADMIN — TAMSULOSIN HYDROCHLORIDE 0.4 MG: 0.4 CAPSULE ORAL at 07:33

## 2025-03-10 RX ADMIN — ACETAMINOPHEN 650 MG: 325 TABLET ORAL at 07:34

## 2025-03-10 ASSESSMENT — PAIN DESCRIPTION - LOCATION: LOCATION: HEAD

## 2025-03-10 ASSESSMENT — PAIN SCALES - GENERAL: PAINLEVEL_OUTOF10: 4

## 2025-03-10 NOTE — PROGRESS NOTES
Urology Attending Progress Note      Subjective: Patient feels better this morning. Appetite good. No fever. Ureteral stent or weiss not bothersome.    Vitals:  /82   Pulse (!) 101   Temp 98.1 °F (36.7 °C)   Resp 16   Ht 1.829 m (6')   Wt (!) 181.3 kg (399 lb 12.8 oz)   SpO2 99%   BMI 54.22 kg/m²   Temp  Av.4 °F (36.9 °C)  Min: 97.2 °F (36.2 °C)  Max: 100.3 °F (37.9 °C)    Intake/Output Summary (Last 24 hours) at 3/10/2025 1038  Last data filed at 3/10/2025 0409  Gross per 24 hour   Intake --   Output 451 ml   Net -451 ml       Exam: Patient in no distress.  Eating his breakfast.   Obese abdomen. No CVA tenderness.   Weiss clear urine.    Labs:  WBC:    Lab Results   Component Value Date/Time    WBC 15.9 03/10/2025 04:59 AM     Hemoglobin/Hematocrit:    Lab Results   Component Value Date/Time    HGB 12.1 03/10/2025 04:59 AM    HCT 37.3 03/10/2025 04:59 AM     BMP:    Lab Results   Component Value Date/Time     03/10/2025 04:58 AM    K 4.4 03/10/2025 04:58 AM     03/10/2025 04:58 AM    CO2 18 03/10/2025 04:58 AM    BUN 34 03/10/2025 04:58 AM    CREATININE 2.3 03/10/2025 04:58 AM    CALCIUM 7.8 03/10/2025 04:58 AM    LABGLOM 32 03/10/2025 04:58 AM     PT/INR:  No results found for: \"PROTIME\", \"INR\"  PTT:  No results found for: \"APTT\"[APTT        Urine Culture:  Pending        Antibiotic Therapy:  Zosyn          Impression/Plan:     POD 1, cystoscopy and right ureteral stenting for UPJ stone with sepsis.   Doing better. WCC improving. Serum creatinine 2.3, baseline 1.6. Zosyn to be dosed accordingly.  Nephrology consult requested  Discussed with patient about intraoperative findings. Explained needs follow up for definitive stone surgery with Dr. Harp and also informed about importance of post op follow up to avoid retained ureteral stent.   For weiss removal tonight.       Speedy Garvin MD

## 2025-03-10 NOTE — PROGRESS NOTES
Pharmacy Automatic Dose Adjustment                Subcutaneous Anticoagulant for Prophylaxis    Eldon Nichole is a 59 y.o. male.     Recent Labs     03/09/25  1331 03/10/25  0458   CREATININE 1.5* 2.3*       Estimated Creatinine Clearance: 58 mL/min (A) (based on SCr of 2.3 mg/dL (H)).    Weight:  Wt Readings from Last 1 Encounters:   03/10/25 (!) 181.3 kg (399 lb 12.8 oz)           Pharmacy has adjusted subcutaneous anticoagulant for prophylaxis to Enoxaparin 60 mg SC twice daily based on the patient's weight and estimated CrCl per [facility] policy.             IRINA FARMER, PHARM D, 3/10/2025, 12:52 PM

## 2025-03-10 NOTE — PROGRESS NOTES
Hospitalist Progress Note        PCP: Julio Cesar Mosqueda MD    Date of Admission: 3/9/2025    Length of Stay: 1    Chief Complaint: AMS and fall at home.     Subjective:     Feels better today.     Reports has had word finding difficulties and memory problems most of his life, so not new.             Medications:  Reviewed    Infusion Medications    sodium bicarbonate 75 mEq in sodium chloride 0.45 % 1,000 mL infusion 125 mL/hr at 03/10/25 1059    sodium chloride       Scheduled Medications    enoxaparin  60 mg SubCUTAneous BID    sodium chloride flush  5-40 mL IntraVENous 2 times per day    piperacillin-tazobactam  4,500 mg IntraVENous Once    Followed by    piperacillin-tazobactam  4,500 mg IntraVENous Q8H    tamsulosin  0.4 mg Oral Daily     PRN Meds: sodium chloride flush, sodium chloride, ondansetron **OR** ondansetron, acetaminophen **OR** acetaminophen, polyethylene glycol, HYDROmorphone      Intake/Output Summary (Last 24 hours) at 3/10/2025 1426  Last data filed at 3/10/2025 1423  Gross per 24 hour   Intake --   Output 901 ml   Net -901 ml       Physical Exam Performed:    /88   Pulse 94   Temp (!) 96.4 °F (35.8 °C)   Resp 16   Ht 1.829 m (6')   Wt (!) 181.3 kg (399 lb 12.8 oz)   SpO2 97%   BMI 54.22 kg/m²     General appearance: No apparent distress, appears stated age and cooperative.  HEENT: Pupils equal, round, and reactive to light. Conjunctivae/corneas clear.  Neck: Supple, with full range of motion. No jugular venous distention. Trachea midline.  Respiratory:  Normal respiratory effort. Clear to auscultation, bilaterally without Rales/Wheezes/Rhonchi.  Cardiovascular: Regular rate and rhythm with normal S1/S2 without murmurs, rubs or gallops.  Abdomen: Soft, non-tender, non-distended with normal bowel sounds.  Musculoskeletal: No clubbing, cyanosis or edema bilaterally.  Full range of motion without deformity.  Skin: Skin color, texture, turgor normal.  No rashes or lesions.  Neurologic:    03/09/2025   Time:    14:14       CT HEAD WO CONTRAST   Final Result   No acute intracranial process.        All CT scans are performed using dose optimization techniques as appropriate to the performed exam and include    at least one of the following: Automated exposure control, adjustment of the mA and/or kV according to size, and the use of iterative reconstruction technique.        ______________________________________    Electronically signed by: GISELA LUJAN M.D.   Date:     03/09/2025   Time:    14:13       XR CHEST PORTABLE   Final Result          IP CONSULT TO UROLOGY  IP CONSULT TO NEPHROLOGY    Assessment/Plan:    Active Hospital Problems    Diagnosis     Urinary tract infection [N39.0]     Altered mental status [R41.82]     Renal calculi [N20.0]        UTI  Obstructing nephrolithiasis.   Sepsis.   Bacteremia - culture pending.    - s/p cytoscopy with R stent placement with Dr Garvin 3/9   - repeat blood Cx.   - follow urine Cx   - cont IV zosyn.       Acute metabolic encephalopathy secondary to bacteremia as above  Resolving.       Morbid Obesity   .           DVT Prophylaxis: lovenox  Diet: ADULT DIET; Regular  Code Status: Full Code      Dispo - cc        Nelia Keys MD

## 2025-03-10 NOTE — OP NOTE
Operative Note      Patient: Eldon Nichole  YOB: 1965  MRN: [id number]    Date of Procedure: 3/9/2025    Pre-operative diagnosis; Right UPJ stone, renal stones, hydronephrosis and UTI    Post-Op Diagnosis: Same       Procedure(s):  CYSTOSCOPY URETERAL STENT INSERTION, limited retrograde urography    Surgeon(s):  Speedy Garvin MD    Assistant:   * No surgical staff found *    Anesthesia: General    Estimated Blood Loss (mL): Minimal    Complications: None    Specimens:   ID Type Source Tests Collected by Time Destination   1 : Urine for culture Urine Bladder CULTURE, SURGICAL Speedy Garvin MD 3/9/2025 1930        Implants:  Implant Name Type Inv. Item Serial No.  Lot No. LRB No. Used Action   STENT URETERAL 6 FRX28 CM SOFT MONOFILAMENT TRIA - [id number]  STENT URETERAL 6 FRX28 CM SOFT MONOFILAMENT TRIA  [id number] Right 1 Implanted         Drains:   Ureteral Drain/Stent 03/09/25 Right Ureter (Active)       Findings:  Infection Present on gross urine exam. Mild bulbar and penile urethral strictures.   Moderately enlarged occlusive lateral lobes of prostate  Moderate hydronephrosis on limited retrograde urography. Stones not seen easily, patient morbidly obese.  6fr x 28cm soft Tria ureteral stent in place.     Detailed Description of Procedure:   Patient seen in the preoperative holding area. Answered all the questions. Had temperature of 104. Brought into the operating room. After induction of GA, placed in the cystolithotomy position. Extremities and joints carefully positioned and passed. Genitals sterily prepped and draped. Had Zosyn antibiotic.  Time out performed.  I then passed a 22fr rigid cystoscope. There were multiple mild ring like proximal penile and bulbar urethral strictures.   Moderately enlarged occlusive lateral lobes or prostate. Bladder normal including Uos.   I then cannulated the right UO with 5fr ureteral catheter. I then performed 
normal...

## 2025-03-10 NOTE — CONSULTS
Renal Consult Note    Thank you to requesting provider: Dr Keys, for asking us to see Eldon Nichole    Reason for consultation:  PRAMOD    Chief Complaint:  right flank pain    History of Presenting Illness      Patient is a 59-year-old pleasant man with a past medical history of morbid obesity and hypertension.  He initially presented for a fall and some altered mental status.  He was having some stool incontinence and urinary frequency.  He denied abdominal pain chest pain and shortness of breath.  He also denied fever.  His presenting history was negative at his abdominal CT showed right renal calculi measuring 0.8 cm in the right UPJ with possible obstruction and moderate hydronephrosis.  Patient was started on intravenous antibiotics for possible urinary tract infection.  His CK level was also elevated at 1976.  His presenting serum creatinine was 1.5.  Patient was seen by urology and a ureteral stent was placed on March 9.  His serum creatinine has subsequently increased to 2.3.  Patient is nonoliguric.  He is currently receiving IV fluids.  Renal service was consulted to manage his PRAMOD.    Past Medical/Surgical History      Active Ambulatory Problems     Diagnosis Date Noted    No Active Ambulatory Problems     Resolved Ambulatory Problems     Diagnosis Date Noted    No Resolved Ambulatory Problems     Past Medical History:   Diagnosis Date    Hypertension          Review of Systems     Constitutional:  No weight loss, no fever/chills  Eyes:  No eye pain, no eye redness  Cardiovascular:  No chest pain, no worsening of edema  Respiratory:  No hemoptysis, no stidor  Gastrointestinal:  No blood in stool, no n/v, no diarrhea  Genitoruinary:  No hematuria, no difficulty with urination  Musculoskeletal:  No joint swelling, no redness  Integumentary:  No Rash, no itching  Neurological:  No focal weakness, No new sensory deficit  Psychiatric:  No depression, no confusion  Endocrine:  No polyuria, no polydipsia  peripheral edema  Neurological:  Moving all four extremities   Lymphatics:  No palpable lymph nodes   Skin:  No rash, no jaundice  Psychiatric:  Normal insight and judgement, good recall    Data     Recent Labs     03/09/25  1157 03/10/25  0459   WBC 21.1* 15.9*   HGB 13.7* 12.1*   HCT 40.4* 37.3*   MCV 96.7* 101.6*    152     Recent Labs     03/09/25  1331 03/09/25  1650 03/10/25  0458   *  --  129*   K 4.0 3.9 4.4   CL 97*  --  100   CO2 17*  --  18*   GLUCOSE 204*  --  167*   BUN 23*  --  34*   CREATININE 1.5*  --  2.3*   LABGLOM 53*  --  32*       Assessment:  PRAMOD-likely related to obstruction  Cannot rule out underlying chronic kidney disease-stage is unknown  Morbid obesity  Hypertension  Hyponatremia  Leukocytosis  Urinary tract infection  Right hydronephrosis  Obstructing right UPJ stones  Sepsis due to gram-negative rods  Hyperglycemia-rule out underlying type 2 diabetes  Metabolic acidosis  Mildly elevated CK level      Plan:  At this stage, I agree with current management of ureteral stent placement, IV fluids and antibiotics.  Follow-up final blood culture results.  Avoid hypotension nephrotoxins.  I will check hemoglobin A1c.  Will also check uric acid level and follow-up CK level.    Thank you for asking us to participate in the management of your patient, please do not hesitate to contact me for any concerns regarding my recommendations as outlined above.    -----------------------------  Electronically signed by Jake Bojorquez MD on 3/10/25 at 12:11 PM CDT

## 2025-03-11 LAB
ANION GAP SERPL CALCULATED.3IONS-SCNC: 10 MMOL/L (ref 8–16)
BACTERIA BLD CULT: ABNORMAL
BACTERIA BLD CULT: ABNORMAL
BACTERIA UR CULT: ABNORMAL
BACTERIA UR CULT: ABNORMAL
BASOPHILS # BLD: 0 K/UL (ref 0–0.2)
BASOPHILS NFR BLD: 0.3 % (ref 0–1)
BUN SERPL-MCNC: 31 MG/DL (ref 6–20)
CALCIUM SERPL-MCNC: 8 MG/DL (ref 8.6–10)
CHLORIDE SERPL-SCNC: 102 MMOL/L (ref 98–107)
CK SERPL-CCNC: 1333 U/L (ref 39–308)
CO2 SERPL-SCNC: 22 MMOL/L (ref 22–29)
CREAT SERPL-MCNC: 2.1 MG/DL (ref 0.7–1.2)
EOSINOPHIL # BLD: 0.1 K/UL (ref 0–0.6)
EOSINOPHIL NFR BLD: 0.9 % (ref 0–5)
ERYTHROCYTE [DISTWIDTH] IN BLOOD BY AUTOMATED COUNT: 14.6 % (ref 11.5–14.5)
GLUCOSE SERPL-MCNC: 118 MG/DL (ref 70–99)
HCT VFR BLD AUTO: 35 % (ref 42–52)
HGB BLD-MCNC: 11.6 G/DL (ref 14–18)
IMM GRANULOCYTES # BLD: 0.1 K/UL
LYMPHOCYTES # BLD: 1.1 K/UL (ref 1.1–4.5)
LYMPHOCYTES NFR BLD: 12.4 % (ref 20–40)
MCH RBC QN AUTO: 32.4 PG (ref 27–31)
MCHC RBC AUTO-ENTMCNC: 33.1 G/DL (ref 33–37)
MCV RBC AUTO: 97.8 FL (ref 80–94)
MONOCYTES # BLD: 1 K/UL (ref 0–0.9)
MONOCYTES NFR BLD: 11.6 % (ref 0–10)
NEUTROPHILS # BLD: 6.6 K/UL (ref 1.5–7.5)
NEUTS SEG NFR BLD: 74.2 % (ref 50–65)
ORGANISM: ABNORMAL
ORGANISM: ABNORMAL
PLATELET # BLD AUTO: 147 K/UL (ref 130–400)
PMV BLD AUTO: 10.6 FL (ref 9.4–12.4)
POTASSIUM SERPL-SCNC: 3.9 MMOL/L (ref 3.5–5)
RBC # BLD AUTO: 3.58 M/UL (ref 4.7–6.1)
SODIUM SERPL-SCNC: 134 MMOL/L (ref 136–145)
URATE SERPL-MCNC: 6.5 MG/DL (ref 3.4–7)
WBC # BLD AUTO: 8.9 K/UL (ref 4.8–10.8)

## 2025-03-11 PROCEDURE — 2580000003 HC RX 258: Performed by: UROLOGY

## 2025-03-11 PROCEDURE — 2500000003 HC RX 250 WO HCPCS: Performed by: INTERNAL MEDICINE

## 2025-03-11 PROCEDURE — 36415 COLL VENOUS BLD VENIPUNCTURE: CPT

## 2025-03-11 PROCEDURE — 94760 N-INVAS EAR/PLS OXIMETRY 1: CPT

## 2025-03-11 PROCEDURE — 6360000002 HC RX W HCPCS: Performed by: UROLOGY

## 2025-03-11 PROCEDURE — 84550 ASSAY OF BLOOD/URIC ACID: CPT

## 2025-03-11 PROCEDURE — 85025 COMPLETE CBC W/AUTO DIFF WBC: CPT

## 2025-03-11 PROCEDURE — 6370000000 HC RX 637 (ALT 250 FOR IP): Performed by: UROLOGY

## 2025-03-11 PROCEDURE — 80048 BASIC METABOLIC PNL TOTAL CA: CPT

## 2025-03-11 PROCEDURE — 1200000000 HC SEMI PRIVATE

## 2025-03-11 PROCEDURE — 2580000003 HC RX 258: Performed by: INTERNAL MEDICINE

## 2025-03-11 PROCEDURE — 82550 ASSAY OF CK (CPK): CPT

## 2025-03-11 RX ADMIN — ENOXAPARIN SODIUM 60 MG: 100 INJECTION SUBCUTANEOUS at 20:19

## 2025-03-11 RX ADMIN — PIPERACILLIN AND TAZOBACTAM 4500 MG: 4; .5 INJECTION, POWDER, LYOPHILIZED, FOR SOLUTION INTRAVENOUS at 11:28

## 2025-03-11 RX ADMIN — PIPERACILLIN AND TAZOBACTAM 4500 MG: 4; .5 INJECTION, POWDER, LYOPHILIZED, FOR SOLUTION INTRAVENOUS at 04:32

## 2025-03-11 RX ADMIN — PIPERACILLIN AND TAZOBACTAM 4500 MG: 4; .5 INJECTION, POWDER, LYOPHILIZED, FOR SOLUTION INTRAVENOUS at 18:06

## 2025-03-11 RX ADMIN — ENOXAPARIN SODIUM 60 MG: 100 INJECTION SUBCUTANEOUS at 08:08

## 2025-03-11 RX ADMIN — SODIUM BICARBONATE: 84 INJECTION, SOLUTION INTRAVENOUS at 20:22

## 2025-03-11 RX ADMIN — TAMSULOSIN HYDROCHLORIDE 0.4 MG: 0.4 CAPSULE ORAL at 08:09

## 2025-03-11 NOTE — PROGRESS NOTES
Renal Progress Note    Thank you to requesting provider: Dr Keys, for asking us to see Eldon Nichole    Reason for consultation:  PRAMOD    Chief Complaint:  right flank pain    History of Presenting Illness      Patient is a 59-year-old pleasant man with a past medical history of morbid obesity and hypertension.  He initially presented for a fall and some altered mental status.  He was having some stool incontinence and urinary frequency.  He denied abdominal pain chest pain and shortness of breath.  He also denied fever.  His presenting history was negative at his abdominal CT showed right renal calculi measuring 0.8 cm in the right UPJ with possible obstruction and moderate hydronephrosis.  Patient was started on intravenous antibiotics for possible urinary tract infection.  His CK level was also elevated at 1976.  His presenting serum creatinine was 1.5.  Patient was seen by urology and a ureteral stent was placed on March 9.  His serum creatinine has subsequently increased to 2.3.  Patient is nonoliguric.  He is currently receiving IV fluids.  Renal service was consulted to manage his PRAMOD.  Today, he noticed less dysuria since his Mcgee cath was removed. His urine remains a bit cloudy.    Past Medical/Surgical History      Active Ambulatory Problems     Diagnosis Date Noted    No Active Ambulatory Problems     Resolved Ambulatory Problems     Diagnosis Date Noted    No Resolved Ambulatory Problems     Past Medical History:   Diagnosis Date    Hypertension          Review of Systems     Constitutional:  No weight loss, no fever/chills  Eyes:  No eye pain, no eye redness  Cardiovascular:  No chest pain, no worsening of edema  Respiratory:  No hemoptysis, no stidor  Gastrointestinal:  No blood in stool, no n/v, no diarrhea  Genitoruinary:  No hematuria, no difficulty with urination  Musculoskeletal:  No joint swelling, no redness  Integumentary:  No Rash, no itching  Neurological:  No focal weakness, No new  NTND, soft, +BS, no hepatosplenomegaly  Extremities:  No peripheral edema  Neurological:  Moving all four extremities   Psychiatric:  Normal insight and judgement, good recall    Data     Recent Labs     03/09/25  1157 03/10/25  0459 03/11/25  0617   WBC 21.1* 15.9* 8.9   HGB 13.7* 12.1* 11.6*   HCT 40.4* 37.3* 35.0*   MCV 96.7* 101.6* 97.8*    152 147     Recent Labs     03/09/25  1331 03/09/25  1650 03/10/25  0458 03/11/25  0617   *  --  129* 134*   K 4.0 3.9 4.4 3.9   CL 97*  --  100 102   CO2 17*  --  18* 22   GLUCOSE 204*  --  167* 118*   BUN 23*  --  34* 31*   CREATININE 1.5*  --  2.3* 2.1*   LABGLOM 53*  --  32* 35*       Assessment:  PRAMOD-likely related to obstruction  Cannot rule out underlying chronic kidney disease-stage is unknown  Morbid obesity  Hypertension  Hyponatremia  Leukocytosis  Urinary tract infection  Right hydronephrosis  Obstructing right UPJ stones  Sepsis due to gram-negative rods  Hyperglycemia-rule out underlying type 2 diabetes  Metabolic acidosis  Mildly elevated CK level      Plan:  Continue gentle IV fluids along with antibiotics.  Follow-up final blood culture results.  Avoid hypotension and nephrotoxins. Follow-up CK level. If discharged, follow up at the renal clinic within 10- 14 days.

## 2025-03-11 NOTE — PROGRESS NOTES
Hospitalist Progress Note        PCP: Julio Cesar Mosqueda MD    Date of Admission: 3/9/2025    Length of Stay: 2    Chief Complaint: AMS and fall at home.     Subjective:     Feels better today.     Up and ambulating today and feels stronger and steady.               Medications:  Reviewed    Infusion Medications    sodium bicarbonate 75 mEq in sodium chloride 0.45 % 1,000 mL infusion 75 mL/hr at 03/11/25 0809    sodium chloride       Scheduled Medications    enoxaparin  60 mg SubCUTAneous BID    sodium chloride flush  5-40 mL IntraVENous 2 times per day    piperacillin-tazobactam  4,500 mg IntraVENous Once    Followed by    piperacillin-tazobactam  4,500 mg IntraVENous Q8H    tamsulosin  0.4 mg Oral Daily     PRN Meds: sodium chloride flush, sodium chloride, ondansetron **OR** ondansetron, acetaminophen **OR** acetaminophen, polyethylene glycol, HYDROmorphone      Intake/Output Summary (Last 24 hours) at 3/11/2025 1343  Last data filed at 3/11/2025 1028  Gross per 24 hour   Intake --   Output 2550 ml   Net -2550 ml       Physical Exam Performed:    /67   Pulse 79   Temp 97.5 °F (36.4 °C)   Resp 16   Ht 1.829 m (6')   Wt (!) 179.7 kg (396 lb 1.6 oz)   SpO2 97%   BMI 53.72 kg/m²     General appearance: No apparent distress, appears stated age and cooperative.  HEENT: Pupils equal, round, and reactive to light. Conjunctivae/corneas clear.  Neck: Supple, with full range of motion. No jugular venous distention. Trachea midline.  Respiratory:  Normal respiratory effort. Clear to auscultation, bilaterally without Rales/Wheezes/Rhonchi.  Cardiovascular: Regular rate and rhythm with normal S1/S2 without murmurs, rubs or gallops.  Abdomen: Soft, non-tender, non-distended with normal bowel sounds.  Musculoskeletal: No clubbing, cyanosis or edema bilaterally.  Full range of motion without deformity.  Skin: Skin color, texture, turgor normal.  No rashes or lesions.  Neurologic:  Neurovascularly intact without any

## 2025-03-11 NOTE — PROGRESS NOTES
Urology Attending Progress Note      Subjective: Patient feels better this morning. Appetite good. No fever. Ureteral stent or weiss not bothersome. Voiding urine clear. No flank pain.Urine culture showed proteus UTI sensitive to cipro.    Vitals:  /67   Pulse 79   Temp 97.5 °F (36.4 °C)   Resp 16   Ht 1.829 m (6')   Wt (!) 179.7 kg (396 lb 1.6 oz)   SpO2 96%   BMI 53.72 kg/m²   Temp  Av.5 °F (36.4 °C)  Min: 97.2 °F (36.2 °C)  Max: 97.9 °F (36.6 °C)    Intake/Output Summary (Last 24 hours) at 3/11/2025 1204  Last data filed at 3/11/2025 1028  Gross per 24 hour   Intake --   Output 2550 ml   Net -2550 ml       Exam: Patient in no distress.  Eating his breakfast.   Obese abdomen. No CVA tenderness.       Labs:  WBC:    Lab Results   Component Value Date/Time    WBC 8.9 2025 06:17 AM     Hemoglobin/Hematocrit:    Lab Results   Component Value Date/Time    HGB 11.6 2025 06:17 AM    HCT 35.0 2025 06:17 AM     BMP:    Lab Results   Component Value Date/Time     2025 06:17 AM    K 3.9 2025 06:17 AM     2025 06:17 AM    CO2 22 2025 06:17 AM    BUN 31 2025 06:17 AM    CREATININE 2.1 2025 06:17 AM    CALCIUM 8.0 2025 06:17 AM    LABGLOM 35 2025 06:17 AM     PT/INR:  No results found for: \"PROTIME\", \"INR\"  PTT:  No results found for: \"APTT\"[APTT        Antibiotic Therapy:  Zosyn    Culture, Urine  Order: [id number]   Status: Final result       Next appt: None    Test Result Released: No    Specimen Information: Urine, clean catch   0 Result Notes      Component  Ref Range & Units (hover)    Urine Culture, Routine >100,000 CFU/ml Abnormal    Organism Proteus mirabilis Abnormal    Urine Culture, Routine Heavy growth   Resulting Agency [facility]        Susceptibility    Proteus mirabilis (1)    Antibiotic Interpretation BE  Method Status    ampicillin Sensitive <=2 mcg/mL BACTERIAL SUSCEPTIBILITY PANEL BY BE

## 2025-03-12 VITALS
TEMPERATURE: 97.2 F | WEIGHT: 315 LBS | RESPIRATION RATE: 16 BRPM | HEART RATE: 84 BPM | BODY MASS INDEX: 42.66 KG/M2 | OXYGEN SATURATION: 99 % | SYSTOLIC BLOOD PRESSURE: 137 MMHG | DIASTOLIC BLOOD PRESSURE: 92 MMHG | HEIGHT: 72 IN

## 2025-03-12 LAB
ANION GAP SERPL CALCULATED.3IONS-SCNC: 8 MMOL/L (ref 8–16)
BACTERIA BLD CULT ORG #2: ABNORMAL
BACTERIA UR CULT: ABNORMAL
BACTERIA UR CULT: ABNORMAL
BASOPHILS # BLD: 0 K/UL (ref 0–0.2)
BASOPHILS NFR BLD: 0.3 % (ref 0–1)
BUN SERPL-MCNC: 24 MG/DL (ref 6–20)
CALCIUM SERPL-MCNC: 7.9 MG/DL (ref 8.6–10)
CHLORIDE SERPL-SCNC: 102 MMOL/L (ref 98–107)
CO2 SERPL-SCNC: 24 MMOL/L (ref 22–29)
CREAT SERPL-MCNC: 1.7 MG/DL (ref 0.7–1.2)
EKG P AXIS: 62 DEGREES
EKG P-R INTERVAL: 156 MS
EKG Q-T INTERVAL: 312 MS
EKG QRS DURATION: 88 MS
EKG QTC CALCULATION (BAZETT): 397 MS
EKG T AXIS: 46 DEGREES
EOSINOPHIL # BLD: 0.1 K/UL (ref 0–0.6)
EOSINOPHIL NFR BLD: 1.1 % (ref 0–5)
ERYTHROCYTE [DISTWIDTH] IN BLOOD BY AUTOMATED COUNT: 14.6 % (ref 11.5–14.5)
GLUCOSE SERPL-MCNC: 103 MG/DL (ref 70–99)
HBA1C MFR BLD: 5.7 % (ref 4–5.6)
HCT VFR BLD AUTO: 34.8 % (ref 42–52)
HGB BLD-MCNC: 11.5 G/DL (ref 14–18)
IMM GRANULOCYTES # BLD: 0 K/UL
LYMPHOCYTES # BLD: 1.5 K/UL (ref 1.1–4.5)
LYMPHOCYTES NFR BLD: 20.4 % (ref 20–40)
MCH RBC QN AUTO: 32.7 PG (ref 27–31)
MCHC RBC AUTO-ENTMCNC: 33 G/DL (ref 33–37)
MCV RBC AUTO: 98.9 FL (ref 80–94)
MONOCYTES # BLD: 0.8 K/UL (ref 0–0.9)
MONOCYTES NFR BLD: 10.9 % (ref 0–10)
NEUTROPHILS # BLD: 5.1 K/UL (ref 1.5–7.5)
NEUTS SEG NFR BLD: 66.9 % (ref 50–65)
ORGANISM: ABNORMAL
ORGANISM: ABNORMAL
PLATELET # BLD AUTO: 153 K/UL (ref 130–400)
PMV BLD AUTO: 10.9 FL (ref 9.4–12.4)
POTASSIUM SERPL-SCNC: 3.9 MMOL/L (ref 3.5–5)
RBC # BLD AUTO: 3.52 M/UL (ref 4.7–6.1)
SODIUM SERPL-SCNC: 134 MMOL/L (ref 136–145)
WBC # BLD AUTO: 7.6 K/UL (ref 4.8–10.8)

## 2025-03-12 PROCEDURE — 80048 BASIC METABOLIC PNL TOTAL CA: CPT

## 2025-03-12 PROCEDURE — 36415 COLL VENOUS BLD VENIPUNCTURE: CPT

## 2025-03-12 PROCEDURE — 6370000000 HC RX 637 (ALT 250 FOR IP): Performed by: UROLOGY

## 2025-03-12 PROCEDURE — 85025 COMPLETE CBC W/AUTO DIFF WBC: CPT

## 2025-03-12 PROCEDURE — 6360000002 HC RX W HCPCS: Performed by: UROLOGY

## 2025-03-12 PROCEDURE — 94760 N-INVAS EAR/PLS OXIMETRY 1: CPT

## 2025-03-12 PROCEDURE — 99232 SBSQ HOSP IP/OBS MODERATE 35: CPT | Performed by: NURSE PRACTITIONER

## 2025-03-12 PROCEDURE — 2580000003 HC RX 258: Performed by: UROLOGY

## 2025-03-12 PROCEDURE — 93010 ELECTROCARDIOGRAM REPORT: CPT | Performed by: INTERNAL MEDICINE

## 2025-03-12 PROCEDURE — 83036 HEMOGLOBIN GLYCOSYLATED A1C: CPT

## 2025-03-12 RX ORDER — TAMSULOSIN HYDROCHLORIDE 0.4 MG/1
0.4 CAPSULE ORAL DAILY
Qty: 30 CAPSULE | Refills: 3 | Status: SHIPPED | OUTPATIENT
Start: 2025-03-12

## 2025-03-12 RX ORDER — OXYCODONE AND ACETAMINOPHEN 5; 325 MG/1; MG/1
1 TABLET ORAL EVERY 6 HOURS PRN
Qty: 12 TABLET | Refills: 0 | Status: SHIPPED | OUTPATIENT
Start: 2025-03-12 | End: 2025-03-15

## 2025-03-12 RX ORDER — PHENAZOPYRIDINE HYDROCHLORIDE 100 MG/1
100 TABLET, FILM COATED ORAL 2 TIMES DAILY PRN
Qty: 10 TABLET | Refills: 0 | Status: SHIPPED | OUTPATIENT
Start: 2025-03-12 | End: 2026-03-12

## 2025-03-12 RX ORDER — CIPROFLOXACIN 500 MG/1
500 TABLET, FILM COATED ORAL 2 TIMES DAILY
Qty: 42 TABLET | Refills: 0 | Status: SHIPPED | OUTPATIENT
Start: 2025-03-12 | End: 2025-04-02

## 2025-03-12 RX ADMIN — TAMSULOSIN HYDROCHLORIDE 0.4 MG: 0.4 CAPSULE ORAL at 07:47

## 2025-03-12 RX ADMIN — PIPERACILLIN AND TAZOBACTAM 4500 MG: 4; .5 INJECTION, POWDER, LYOPHILIZED, FOR SOLUTION INTRAVENOUS at 10:22

## 2025-03-12 RX ADMIN — ENOXAPARIN SODIUM 60 MG: 100 INJECTION SUBCUTANEOUS at 07:49

## 2025-03-12 RX ADMIN — PIPERACILLIN AND TAZOBACTAM 4500 MG: 4; .5 INJECTION, POWDER, LYOPHILIZED, FOR SOLUTION INTRAVENOUS at 01:33

## 2025-03-12 NOTE — PROGRESS NOTES
Urology Progress Note    SUBJECTIVE: Patient resting in bed.  No significant complaints.  Mcgee catheter removed yesterday.  Voiding well on his own.  Some mild flank pain from his stent.  Some mild lower urinary tract symptoms including frequency, urgency.    OBJECTIVE:   Urine and blood cultures with Proteus  Currently on Zosyn day #3: Resistant only to Macrobid and Ancef    Review of Systems     Physical  VITALS:  /68   Pulse 78   Temp 98.5 °F (36.9 °C) (Temporal)   Resp 18   Ht 1.829 m (6')   Wt (!) 179.7 kg (396 lb 1.6 oz)   SpO2 99%   BMI 53.72 kg/m²   TEMPERATURE:  Current - Temp: 98.5 °F (36.9 °C); Max - Temp  Av.1 °F (36.7 °C)  Min: 97.5 °F (36.4 °C)  Max: 98.5 °F (36.9 °C)   24 HR I&O   Intake/Output Summary (Last 24 hours) at 3/12/2025 0832  Last data filed at 3/12/2025 0138  Gross per 24 hour   Intake 1440 ml   Output 1050 ml   Net 390 ml     BACK: Mild tenderness to right flank  ABDOMEN:  non-distended and non-tender  HEART:  normal rate  CHEST: Normal respiratory effort  GENITAL/URINARY: Voiding on own.    Data  CBC:   Recent Labs     03/10/25  0459 25  0617 25  0511   WBC 15.9* 8.9 7.6   HGB 12.1* 11.6* 11.5*   HCT 37.3* 35.0* 34.8*    147 153     BMP:    Recent Labs     03/10/25  0458 25  0617 25  0511   * 134* 134*   K 4.4 3.9 3.9    102 102   CO2 18* 22 24   BUN 34* 31* 24*   CREATININE 2.3* 2.1* 1.7*   GLUCOSE 167* 118* 103*       Recent Labs     03/10/25  0201   LABURIN 3,000 CFU/ml*  3 colonies  Refer to (urine culture collected 25@1214) for sensitivity results       Recent Labs     03/10/25  1207   BC No Growth to date.  Any change in status will be called.     Recent Labs     03/10/25  1210   BLOODCULT2 No Growth to date.  Any change in status will be called.         U/A:    Lab Results   Component Value Date/Time    COLORU YELLOW 2025 12:14 PM    PHUR >=9.0 2025 12:14 PM    WBCUA 3-5 2025 12:14 PM    RBCUA  0-1 03/09/2025 12:14 PM    MUCUS 2+ 03/09/2025 12:14 PM    BACTERIA 3+ 03/09/2025 12:14 PM    CLARITYU CLOUDY 03/09/2025 12:14 PM    LEUKOCYTESUR MODERATE 03/09/2025 12:14 PM    UROBILINOGEN 1.0 03/09/2025 12:14 PM    BILIRUBINUR SMALL 03/09/2025 12:14 PM    BLOODU LARGE 03/09/2025 12:14 PM    GLUCOSEU NEGATIVE 03/09/2025 12:14 PM       ASSESSMENT AND PLAN    Patient Active Problem List   Diagnosis    Urinary tract infection    Altered mental status    Renal calculi     Obstructive pyelonephritis secondary to a right UPJ stone measuring about 8 mm with other nonobstructing stones within the right kidney.  Status postplacement of a right ureteral stent on 3/9/2025 by Dr. Garvin.  Leukocytosis resolved.  Will need definitive stone treatment most likely with ureteroscopy secondary to body habitus after at least 14 days of antibiotic therapy.   Proteus noted on blood and urine cultures.  Resistant to Macrobid.  Intermittent to Ancef.  Currently on Zosyn.  On discharge could be transitioned to ciprofloxacin.  Again, needs at least 14 days of antibiotic therapy.  Currently on day 3.  Ciprofloxacin, Pyridium, Percocet, Flomax already sent to patient's preferred pharmacy in anticipation of discharge.  PRAMOD.  Creatinine improving.  Nephrology consulted.  Baseline about 1.6.  Creatinine 1.7 this morning.    CHRISTINE FERRER CNP  3/12/2025 8:32 AM

## 2025-03-12 NOTE — PROGRESS NOTES
Physician Progress Note      PATIENT:               PADMINI HAMLIN  CSN #:                  [id number]  :                       1965  ADMIT DATE:       3/9/2025 11:52 AM  DISCH DATE:        3/12/2025 2:14 PM  RESPONDING  PROVIDER #:        Nelia Keys MD          QUERY TEXT:    Pt admitted with UTI and ureteral stone. Pt noted to have Sepsis and acute   organ dysfunction of metabolic encephalopathy. If possible, please document in   progress notes and discharge summary the relationship, if any, between Sepsis   and metabolic encephalopathy.    The medical record reflects the following:  Risk Factors: Sepsis, UTI  Clinical Indicators: Per internal med progress note on 3/11 \"...Acute   metabolic encephalopathy secondary to bacteremia...\"  Treatment: Treatment of sepsis with IV Zosyn  Options provided:  -- Acute organ dysfunction of metabolic encephalopathy is associated with   sepsis  -- Acute organ dysfunction of metabolic encephalopathy is unrelated to sepsis  -- Other - I will add my own diagnosis  -- Disagree - Not applicable / Not valid  -- Disagree - Clinically unable to determine / Unknown  -- Refer to Clinical Documentation Reviewer    PROVIDER RESPONSE TEXT:    This patient has acute organ dysfunction of metabolic encephalopathy   associated with sepsis.    Query created by: Brea Yanez on 3/12/2025 8:53 AM      Electronically signed by:  Nelia Keys MD 3/12/2025 6:47 PM

## 2025-03-12 NOTE — PLAN OF CARE
Problem: Discharge Planning  Goal: Discharge to home or other facility with appropriate resources  Outcome: Progressing  Flowsheets (Taken 3/11/2025 0900 by Sherri Silveira LPN)  Discharge to home or other facility with appropriate resources: Identify barriers to discharge with patient and caregiver     Problem: Safety - Adult  Goal: Free from fall injury  Outcome: Progressing  Flowsheets (Taken 3/11/2025 2149)  Free From Fall Injury: Instruct family/caregiver on patient safety

## 2025-03-12 NOTE — DISCHARGE INSTR - DIET

## 2025-03-12 NOTE — DISCHARGE SUMMARY
Hospital Medicine Discharge Summary    Patient ID: Eldon Nichole      Patient's PCP: Julio Cesar Mosqueda MD    Admit Date: 3/9/2025     Discharge Date: 3/12/2025      Admitting Provider: Nelia Keys MD     Discharge Provider: Nelia Keys MD     Discharge Diagnoses:       Active Hospital Problems    Diagnosis     Urinary tract infection [N39.0]     Altered mental status [R41.82]     Renal calculi [N20.0]        The patient was seen and examined on day of discharge and this discharge summary is in conjunction with any daily progress note from day of discharge.    Hospital Course: 60yo man with Hx of morbid obesity, presented form home with AMS and fall and unable to get up at home. Family reported he was acting out of character and was very confused.       UTI  Obstructing nephrolithiasis.   Sepsis.   Bacteremia of  source                          - Urine Cx x2 and blood culture all growing Proteus               - s/p cytoscopy with R stent placement with Dr Garvin 3/9              - repeat blood Cx - NTD.              - treated with IV zosyn and transition to PO Cipro on discharge.   I gave patient 21-days worth of Abx and advised him to continue Tx till he follows up with urology to have his stone and stent removed.         Acute metabolic encephalopathy secondary to bacteremia as above  Resolving.            Morbid Obesity   .              Physical Exam Performed:     BP (!) 137/92   Pulse 84   Temp 97.2 °F (36.2 °C) (Temporal)   Resp 16   Ht 1.829 m (6')   Wt (!) 179.7 kg (396 lb 1.6 oz)   SpO2 99%   BMI 53.72 kg/m²       General appearance:  No apparent distress, appears stated age and cooperative.  HEENT:  Normal cephalic, atraumatic without obvious deformity. Pupils equal, round, and reactive to light.  Extra ocular muscles intact. Conjunctivae/corneas clear.  Neck: Supple, with full range of motion. No jugular venous distention. Trachea midline.  Respiratory:  Normal  Julio Cesar Mosqueda MD for the opportunity to be involved in this patient's care. If you have any questions or concerns, please feel free to contact me at [phone].

## 2025-03-12 NOTE — DISCHARGE INSTRUCTIONS
Keep follow up appointments  Take medications as directed  Seek medical assistance for worsening symptoms or concerns

## 2025-03-12 NOTE — PROGRESS NOTES
Renal Progress Note    Thank you to requesting provider: Dr Keys, for asking us to see Eldon Nichole    Reason for consultation:  PRAMOD    Chief Complaint:  right flank pain    History of Presenting Illness      Patient is a 59-year-old pleasant man with a past medical history of morbid obesity and hypertension.  He initially presented for a fall and some altered mental status.  He was having some stool incontinence and urinary frequency.  He denied abdominal pain chest pain and shortness of breath.  He also denied fever.  His presenting history was negative at his abdominal CT showed right renal calculi measuring 0.8 cm in the right UPJ with possible obstruction and moderate hydronephrosis.  Patient was started on intravenous antibiotics for possible urinary tract infection.  His CK level was also elevated at 1976.  His presenting serum creatinine was 1.5.  Patient was seen by urology and a ureteral stent was placed on March 9.  His serum creatinine has subsequently increased to 2.3.  Patient is nonoliguric.  He is currently receiving IV fluids.  Renal service was consulted to manage his PRAMOD.  Today, he offers no new complaints. He is being discharged home with antibiotics. His repeat creatinine was 1.7.    Past Medical/Surgical History      Active Ambulatory Problems     Diagnosis Date Noted    No Active Ambulatory Problems     Resolved Ambulatory Problems     Diagnosis Date Noted    No Resolved Ambulatory Problems     Past Medical History:   Diagnosis Date    Hypertension          Review of Systems     Constitutional:  No weight loss, no fever/chills  Eyes:  No eye pain, no eye redness  Cardiovascular:  No chest pain, no worsening of edema  Respiratory:  No hemoptysis, no stidor  Gastrointestinal:  No blood in stool, no n/v, no diarrhea  Genitoruinary:  No hematuria, no difficulty with urination  Musculoskeletal:  No joint swelling, no redness  Integumentary:  No Rash, no itching  Neurological:  No focal  lb 1.6 oz), SpO2 99%.    General:  NAD, A+Ox3, ill-appearing, normal body habitus  HEENT: Atraumatic, normocephalic  Neck: No masses, no JVD  Chest:  CTAB, good respiratory effort, good air movement  CV:  RRR, no murmurs    Abdomen:  NTND, soft, +BS, no hepatosplenomegaly  Extremities:  No peripheral edema  Neurological:  Moving all four extremities   Psychiatric:  Normal insight and judgement, good recall    Data     Recent Labs     03/10/25  0459 03/11/25  0617 03/12/25  0511   WBC 15.9* 8.9 7.6   HGB 12.1* 11.6* 11.5*   HCT 37.3* 35.0* 34.8*   .6* 97.8* 98.9*    147 153     Recent Labs     03/10/25  0458 03/11/25  0617 03/12/25  0511   * 134* 134*   K 4.4 3.9 3.9    102 102   CO2 18* 22 24   GLUCOSE 167* 118* 103*   BUN 34* 31* 24*   CREATININE 2.3* 2.1* 1.7*   LABGLOM 32* 35* 46*       Assessment:  PRAMOD-likely related to obstruction  Cannot rule out underlying chronic kidney disease-stage is unknown  Morbid obesity  Hypertension  Hyponatremia  Leukocytosis  Urinary tract infection  Right hydronephrosis  Obstructing right UPJ stones  Sepsis due to gram-negative rods  Hyperglycemia-rule out underlying type 2 diabetes  Metabolic acidosis  Mildly elevated CK level      Plan:  Continue hydration by mouth along with antibiotics. If discharged, follow up at the renal clinic within 10- 14 days.

## 2025-03-15 LAB
BACTERIA BLD CULT ORG #2: NORMAL
BACTERIA BLD CULT: NORMAL

## 2025-03-25 ENCOUNTER — OFFICE VISIT (OUTPATIENT)
Dept: UROLOGY | Age: 60
End: 2025-03-25
Payer: MEDICARE

## 2025-03-25 ENCOUNTER — PREP FOR PROCEDURE (OUTPATIENT)
Dept: UROLOGY | Age: 60
End: 2025-03-25

## 2025-03-25 VITALS — TEMPERATURE: 98 F | BODY MASS INDEX: 44.1 KG/M2 | HEIGHT: 71 IN | WEIGHT: 315 LBS

## 2025-03-25 DIAGNOSIS — N20.1 OBSTRUCTION OF RIGHT URETEROPELVIC JUNCTION (UPJ) DUE TO STONE: Primary | ICD-10-CM

## 2025-03-25 DIAGNOSIS — Z96.0 URETERAL STENT RETAINED: ICD-10-CM

## 2025-03-25 DIAGNOSIS — N20.1 RIGHT URETERAL STONE: ICD-10-CM

## 2025-03-25 DIAGNOSIS — Z96.0 RETAINED URETERAL STENT: Primary | ICD-10-CM

## 2025-03-25 DIAGNOSIS — N18.9 CHRONIC KIDNEY DISEASE, UNSPECIFIED CKD STAGE: ICD-10-CM

## 2025-03-25 DIAGNOSIS — N11.1 OBSTRUCTIVE PYELONEPHRITIS: ICD-10-CM

## 2025-03-25 PROCEDURE — 1111F DSCHRG MED/CURRENT MED MERGE: CPT | Performed by: NURSE PRACTITIONER

## 2025-03-25 PROCEDURE — G8419 CALC BMI OUT NRM PARAM NOF/U: HCPCS | Performed by: NURSE PRACTITIONER

## 2025-03-25 PROCEDURE — 99214 OFFICE O/P EST MOD 30 MIN: CPT | Performed by: NURSE PRACTITIONER

## 2025-03-25 PROCEDURE — G8427 DOCREV CUR MEDS BY ELIG CLIN: HCPCS | Performed by: NURSE PRACTITIONER

## 2025-03-25 PROCEDURE — 3017F COLORECTAL CA SCREEN DOC REV: CPT | Performed by: NURSE PRACTITIONER

## 2025-03-25 PROCEDURE — 4004F PT TOBACCO SCREEN RCVD TLK: CPT | Performed by: NURSE PRACTITIONER

## 2025-03-25 RX ORDER — LISINOPRIL 10 MG/1
10 TABLET ORAL DAILY
COMMUNITY
Start: 2024-07-10

## 2025-03-25 RX ORDER — PHENAZOPYRIDINE HYDROCHLORIDE 200 MG/1
TABLET, FILM COATED ORAL
COMMUNITY
Start: 2025-03-12

## 2025-03-25 RX ORDER — IBUPROFEN 400 MG/1
400 TABLET, FILM COATED ORAL EVERY 6 HOURS PRN
COMMUNITY

## 2025-03-25 RX ORDER — ACETAMINOPHEN 325 MG/1
650 TABLET ORAL EVERY 6 HOURS PRN
COMMUNITY

## 2025-03-25 RX ORDER — HYDROCODONE BITARTRATE AND ACETAMINOPHEN 5; 325 MG/1; MG/1
1 TABLET ORAL EVERY 6 HOURS PRN
COMMUNITY
Start: 2024-08-02

## 2025-03-25 ASSESSMENT — ENCOUNTER SYMPTOMS
NAUSEA: 0
ABDOMINAL DISTENTION: 0
ABDOMINAL PAIN: 0
BACK PAIN: 0
VOMITING: 0

## 2025-03-25 NOTE — H&P (VIEW-ONLY)
None.     FINDINGS:  Lower Thorax: Within normal limits.  Liver: No mass. Normal morphology.  Biliary: The gallbladder is surgically absent with clips noted in the gallbladder bed. The bile ducts are normal.  Pancreas: No mass or evidence of pancreatitis. No duct dilation.  Spleen: No mass. No splenomegaly.  Adrenals: No mass.  Kidneys/Ureters: No renal mass.  Nonobstructing right renal calculi measuring up to 0.8 cm.  No left renal calculus.  Round region of faintly increased attenuation in the right ureteropelvic junction which may indicate partially obstructing calculus.    Mild right hydronephrosis.  No left hydronephrosis.  No ureteral calculus.  GI Tract: No bowel dilation.  No bowel wall thickening. Prior gastric surgery.  Peritoneal Cavity: No ascites.  Retroperitoneum: No fluid collection.  Lymph Nodes: No lymphadenopathy.  Vasculature: No aortic calcifications.  No aortic or iliac aneurysm within limitations of noncontrast examination.  Pelvis: No mass.  Bladder is normal.  Bones/Soft Tissues: No fracture or lytic lesion.  Degenerative changes of the spine.  Visualized abdominal wall soft tissues are normal.     IMPRESSION:  1.  Nonobstructing right renal calculi measuring up to 0.8 cm.  2.  Round region of faintly increased attenuation in the right UPJ which may indicate partially obstructing calculus.  Mild right hydronephrosis.  3.  No ureteral calculus.  4.  Previous cholecystectomy.  5.  Prior gastric surgery.  6.  Degenerative changes of the spine.  7.  Otherwise unremarkable noncontrast CT scan of the abdomen and pelvis.      All CT scans are performed using dose optimization techniques as appropriate to the performed exam and include   at least one of the following: Automated exposure control, adjustment of the mA and/or kV according to size, and the use of iterative reconstruction technique.      ______________________________________   Electronically signed by: SHANTANU COLLADO M.D.  Date:

## 2025-03-25 NOTE — PROGRESS NOTES
Eldon Nichole is a 59 y.o. male who presents today   Chief Complaint   Patient presents with    Follow-up     I am here today for my 2 week follow up to schedule stone sx.          Patient is a 59-year-old male presents to clinic today to schedule definitive stone treatment.  Was seen in the hospital on 3/9/2025 where he was found obstructive density of increased attenuation in the right UPJ approximately 7 mm.  He also has multiple nonobstructing right renal calculi.  With associated infection blood and urine cultures positive for Proteus.  He underwent placement of right ureteral stent on 3/9/2025 by Dr. Garvin, leukocytosis resolved.  He did have IV antibiotics while hospitalized and has been on oral Cipro since 3/12 for a 21-day course which ends on 4/2.  He does have some urgency but denies any flank pain, fever or chills.  Baseline CKD he was seen by Dr. Bojorquez while hospitalized according to the note he supposed to follow-up with them in about 14 days however when he called their office they would not give him an appointment.  Patient states no prior history of stones.        Past Medical History:   Diagnosis Date    Hypertension        Past Surgical History:   Procedure Laterality Date    CYSTOSCOPY Right 3/9/2025    CYSTOSCOPY URETERAL STENT INSERTION STOVER INSERTION performed by Speedy Garvin MD at [facility] OR       Current Outpatient Medications   Medication Sig Dispense Refill    HYDROcodone-acetaminophen (NORCO) 5-325 MG per tablet Take 1 tablet by mouth every 6 hours as needed.      lisinopril (PRINIVIL;ZESTRIL) 10 MG tablet Take 1 tablet by mouth daily      phenazopyridine (PYRIDIUM) 200 MG tablet       acetaminophen (TYLENOL) 325 MG tablet Take 2 tablets by mouth every 6 hours as needed      ibuprofen (ADVIL;MOTRIN) 400 MG tablet Take 1 tablet by mouth every 6 hours as needed      tamsulosin (FLOMAX) 0.4 MG capsule Take 1 capsule by mouth daily 30 capsule 3    ciprofloxacin (CIPRO) 500 MG

## 2025-03-31 ENCOUNTER — HOSPITAL ENCOUNTER (OUTPATIENT)
Dept: PREADMISSION TESTING | Age: 60
Discharge: HOME | End: 2025-04-04
Payer: MEDICARE

## 2025-03-31 VITALS — HEIGHT: 72 IN | BODY MASS INDEX: 42.66 KG/M2 | WEIGHT: 315 LBS

## 2025-03-31 DIAGNOSIS — Z96.0 RETAINED URETERAL STENT: ICD-10-CM

## 2025-03-31 LAB
ALBUMIN SERPL-MCNC: 4 G/DL (ref 3.5–5.2)
ALP SERPL-CCNC: 79 U/L (ref 40–129)
ALT SERPL-CCNC: 19 U/L (ref 10–50)
ANION GAP SERPL CALCULATED.3IONS-SCNC: 13 MMOL/L (ref 8–16)
AST SERPL-CCNC: 26 U/L (ref 10–50)
BACTERIA URNS QL MICRO: NEGATIVE /HPF
BASOPHILS # BLD: 0 K/UL (ref 0–0.2)
BASOPHILS NFR BLD: 0.5 % (ref 0–1)
BILIRUB SERPL-MCNC: 0.8 MG/DL (ref 0.2–1.2)
BILIRUB UR QL STRIP: NEGATIVE
BUN SERPL-MCNC: 19 MG/DL (ref 6–20)
CALCIUM SERPL-MCNC: 9 MG/DL (ref 8.6–10)
CHLORIDE SERPL-SCNC: 104 MMOL/L (ref 98–107)
CLARITY UR: ABNORMAL
CO2 SERPL-SCNC: 19 MMOL/L (ref 22–29)
COLOR UR: YELLOW
CREAT SERPL-MCNC: 1.5 MG/DL (ref 0.7–1.2)
CRYSTALS URNS MICRO: ABNORMAL /HPF
EOSINOPHIL # BLD: 0.1 K/UL (ref 0–0.6)
EOSINOPHIL NFR BLD: 1.5 % (ref 0–5)
EPI CELLS #/AREA URNS AUTO: 1 /HPF (ref 0–5)
ERYTHROCYTE [DISTWIDTH] IN BLOOD BY AUTOMATED COUNT: 13.8 % (ref 11.5–14.5)
GLUCOSE SERPL-MCNC: 127 MG/DL (ref 70–99)
GLUCOSE UR STRIP.AUTO-MCNC: NEGATIVE MG/DL
HCT VFR BLD AUTO: 38.9 % (ref 42–52)
HGB BLD-MCNC: 12.6 G/DL (ref 14–18)
HGB UR STRIP.AUTO-MCNC: ABNORMAL MG/L
HYALINE CASTS #/AREA URNS AUTO: 5 /HPF (ref 0–8)
IMM GRANULOCYTES # BLD: 0 K/UL
KETONES UR STRIP.AUTO-MCNC: ABNORMAL MG/DL
LEUKOCYTE ESTERASE UR QL STRIP.AUTO: ABNORMAL
LYMPHOCYTES # BLD: 1.3 K/UL (ref 1.1–4.5)
LYMPHOCYTES NFR BLD: 20 % (ref 20–40)
MCH RBC QN AUTO: 32.7 PG (ref 27–31)
MCHC RBC AUTO-ENTMCNC: 32.4 G/DL (ref 33–37)
MCV RBC AUTO: 101 FL (ref 80–94)
MONOCYTES # BLD: 0.6 K/UL (ref 0–0.9)
MONOCYTES NFR BLD: 8.9 % (ref 0–10)
NEUTROPHILS # BLD: 4.5 K/UL (ref 1.5–7.5)
NEUTS SEG NFR BLD: 68.8 % (ref 50–65)
NITRITE UR QL STRIP.AUTO: NEGATIVE
PH UR STRIP.AUTO: 5.5 [PH] (ref 5–8)
PLATELET # BLD AUTO: 229 K/UL (ref 130–400)
PMV BLD AUTO: 11.8 FL (ref 9.4–12.4)
POTASSIUM SERPL-SCNC: 4.8 MMOL/L (ref 3.5–5.1)
PROT SERPL-MCNC: 6.7 G/DL (ref 6.4–8.3)
PROT UR STRIP.AUTO-MCNC: 100 MG/DL
RBC # BLD AUTO: 3.85 M/UL (ref 4.7–6.1)
RBC #/AREA URNS AUTO: 364 /HPF (ref 0–4)
SODIUM SERPL-SCNC: 136 MMOL/L (ref 136–145)
SP GR UR STRIP.AUTO: 1.02 (ref 1–1.03)
UROBILINOGEN UR STRIP.AUTO-MCNC: 0.2 E.U./DL
WBC # BLD AUTO: 6.5 K/UL (ref 4.8–10.8)
WBC #/AREA URNS AUTO: 120 /HPF (ref 0–5)

## 2025-03-31 PROCEDURE — 81001 URINALYSIS AUTO W/SCOPE: CPT

## 2025-03-31 PROCEDURE — 85025 COMPLETE CBC W/AUTO DIFF WBC: CPT

## 2025-03-31 PROCEDURE — 87086 URINE CULTURE/COLONY COUNT: CPT

## 2025-03-31 PROCEDURE — 80053 COMPREHEN METABOLIC PANEL: CPT

## 2025-03-31 PROCEDURE — 87077 CULTURE AEROBIC IDENTIFY: CPT

## 2025-03-31 RX ORDER — OXYCODONE AND ACETAMINOPHEN 5; 325 MG/1; MG/1
1 TABLET ORAL EVERY 4 HOURS PRN
Status: ON HOLD | COMMUNITY
End: 2025-04-02

## 2025-03-31 NOTE — DISCHARGE INSTRUCTIONS
The day before surgery you will receive a phone call from the surgery nurse to let you know what time to arrive on the day of surgery. This call will usually be between 2-4 PM. If you do not receive a phone call by 4 PM the day before your surgery please call [phone] and let them know you have not received an arrival time. If your surgery is on Monday, your call will be on the Friday before your Monday surgery. Please check your voicemail as they may leave a message with that information.  If you are running late or wake up sick the day of surgery please call the above number for further instructions.    PREOPERATIVE GUIDELINES WHEN RECEIVING ANESTHESIA    Do not eat anything after midnight the night before your surgery. You may have water up to 2 hours before your arrival time. No gum or candy the morning of surgery.  This is extremely important for your safety.    Take a bath (or shower) the night before your surgery and you may brush your teeth the morning of your surgery.    Morning of surgery no Nicotine products including smoking, vaping, patches, dip or snuff.     You will be scheduled to arrive at the hospital 2 hours before your surgery, or follow your surgeon's instructions.    Dress comfortably.  Wear loose clothing that will be easy to remove and comfortable for your trip home.    You may wear eyeglasses but bring your cases with you as they must be remove before your surgery. If you wear contacts they will have to be removed before your surgery.    Hearing aids and dentures will need to be removed before your surgery. If you wear dentures, do not glue them in the morning of surgery.     Do not wear any jewelry, including body jewelry.  All jewelry will need to be removed prior to your surgery. This includes wedding rings. Any metal touching your body can cause a burn or may have to be cut off due to swelling or injury.    Do not wear fingernail polish or make-up.    It is best not to bring any

## 2025-04-02 ENCOUNTER — APPOINTMENT (OUTPATIENT)
Dept: GENERAL RADIOLOGY | Age: 60
End: 2025-04-02
Attending: UROLOGY
Payer: MEDICARE

## 2025-04-02 ENCOUNTER — HOSPITAL ENCOUNTER (OUTPATIENT)
Age: 60
Setting detail: OUTPATIENT SURGERY
Discharge: HOME | End: 2025-04-02
Attending: UROLOGY | Admitting: UROLOGY
Payer: MEDICARE

## 2025-04-02 ENCOUNTER — ANESTHESIA (OUTPATIENT)
Dept: OPERATING ROOM | Age: 60
End: 2025-04-02
Payer: MEDICARE

## 2025-04-02 ENCOUNTER — ANESTHESIA EVENT (OUTPATIENT)
Dept: OPERATING ROOM | Age: 60
End: 2025-04-02
Payer: MEDICARE

## 2025-04-02 VITALS
RESPIRATION RATE: 12 BRPM | HEART RATE: 64 BPM | OXYGEN SATURATION: 100 % | SYSTOLIC BLOOD PRESSURE: 130 MMHG | BODY MASS INDEX: 42.66 KG/M2 | TEMPERATURE: 97.3 F | DIASTOLIC BLOOD PRESSURE: 80 MMHG | WEIGHT: 315 LBS | HEIGHT: 72 IN

## 2025-04-02 DIAGNOSIS — Z96.0 RETAINED URETERAL STENT: ICD-10-CM

## 2025-04-02 DIAGNOSIS — N20.0 RENAL CALCULI: Primary | ICD-10-CM

## 2025-04-02 DIAGNOSIS — N20.1 RIGHT URETERAL STONE: ICD-10-CM

## 2025-04-02 LAB — BACTERIA UR CULT: NORMAL

## 2025-04-02 PROCEDURE — 3700000001 HC ADD 15 MINUTES (ANESTHESIA): Performed by: UROLOGY

## 2025-04-02 PROCEDURE — 2580000003 HC RX 258: Performed by: NURSE ANESTHETIST, CERTIFIED REGISTERED

## 2025-04-02 PROCEDURE — 7100000000 HC PACU RECOVERY - FIRST 15 MIN: Performed by: UROLOGY

## 2025-04-02 PROCEDURE — 2500000003 HC RX 250 WO HCPCS: Performed by: NURSE PRACTITIONER

## 2025-04-02 PROCEDURE — 2500000003 HC RX 250 WO HCPCS: Performed by: NURSE ANESTHETIST, CERTIFIED REGISTERED

## 2025-04-02 PROCEDURE — 7100000011 HC PHASE II RECOVERY - ADDTL 15 MIN: Performed by: UROLOGY

## 2025-04-02 PROCEDURE — 2709999900 HC NON-CHARGEABLE SUPPLY: Performed by: UROLOGY

## 2025-04-02 PROCEDURE — 7100000010 HC PHASE II RECOVERY - FIRST 15 MIN: Performed by: UROLOGY

## 2025-04-02 PROCEDURE — 2580000003 HC RX 258: Performed by: ANESTHESIOLOGY

## 2025-04-02 PROCEDURE — C1747 HC ENDOSCOPE, SINGLE, URINARY TRACT: HCPCS | Performed by: UROLOGY

## 2025-04-02 PROCEDURE — 3600000014 HC SURGERY LEVEL 4 ADDTL 15MIN: Performed by: UROLOGY

## 2025-04-02 PROCEDURE — C1758 CATHETER, URETERAL: HCPCS | Performed by: UROLOGY

## 2025-04-02 PROCEDURE — 88300 SURGICAL PATH GROSS: CPT

## 2025-04-02 PROCEDURE — 6360000002 HC RX W HCPCS: Performed by: NURSE PRACTITIONER

## 2025-04-02 PROCEDURE — 6370000000 HC RX 637 (ALT 250 FOR IP): Performed by: UROLOGY

## 2025-04-02 PROCEDURE — 6360000002 HC RX W HCPCS: Performed by: NURSE ANESTHETIST, CERTIFIED REGISTERED

## 2025-04-02 PROCEDURE — C1769 GUIDE WIRE: HCPCS | Performed by: UROLOGY

## 2025-04-02 PROCEDURE — 74420 UROGRAPHY RTRGR +-KUB: CPT

## 2025-04-02 PROCEDURE — 2720000010 HC SURG SUPPLY STERILE: Performed by: UROLOGY

## 2025-04-02 PROCEDURE — 7100000001 HC PACU RECOVERY - ADDTL 15 MIN: Performed by: UROLOGY

## 2025-04-02 PROCEDURE — 6360000004 HC RX CONTRAST MEDICATION: Performed by: UROLOGY

## 2025-04-02 PROCEDURE — 3600000004 HC SURGERY LEVEL 4 BASE: Performed by: UROLOGY

## 2025-04-02 PROCEDURE — 3700000000 HC ANESTHESIA ATTENDED CARE: Performed by: UROLOGY

## 2025-04-02 PROCEDURE — C2617 STENT, NON-COR, TEM W/O DEL: HCPCS | Performed by: UROLOGY

## 2025-04-02 PROCEDURE — C1894 INTRO/SHEATH, NON-LASER: HCPCS | Performed by: UROLOGY

## 2025-04-02 PROCEDURE — 6360000002 HC RX W HCPCS: Performed by: ANESTHESIOLOGY

## 2025-04-02 PROCEDURE — 82360 CALCULUS ASSAY QUANT: CPT

## 2025-04-02 DEVICE — URETERAL STENT BRAIDED SUTURE W/SIDE HOLES 4.8FX24CM
Type: IMPLANTABLE DEVICE | Site: URETER | Status: FUNCTIONAL
Brand: TRIA™ FIRM

## 2025-04-02 RX ORDER — FENTANYL CITRATE 50 UG/ML
INJECTION, SOLUTION INTRAMUSCULAR; INTRAVENOUS
Status: DISCONTINUED | OUTPATIENT
Start: 2025-04-02 | End: 2025-04-02 | Stop reason: SDUPTHER

## 2025-04-02 RX ORDER — ROCURONIUM BROMIDE 10 MG/ML
INJECTION, SOLUTION INTRAVENOUS
Status: DISCONTINUED | OUTPATIENT
Start: 2025-04-02 | End: 2025-04-02 | Stop reason: SDUPTHER

## 2025-04-02 RX ORDER — SODIUM CHLORIDE 0.9 % (FLUSH) 0.9 %
5-40 SYRINGE (ML) INJECTION PRN
Status: DISCONTINUED | OUTPATIENT
Start: 2025-04-02 | End: 2025-04-02 | Stop reason: HOSPADM

## 2025-04-02 RX ORDER — SODIUM CHLORIDE 9 MG/ML
INJECTION, SOLUTION INTRAVENOUS PRN
Status: DISCONTINUED | OUTPATIENT
Start: 2025-04-02 | End: 2025-04-02 | Stop reason: HOSPADM

## 2025-04-02 RX ORDER — PROCHLORPERAZINE EDISYLATE 5 MG/ML
5 INJECTION INTRAMUSCULAR; INTRAVENOUS
Status: DISCONTINUED | OUTPATIENT
Start: 2025-04-02 | End: 2025-04-02 | Stop reason: HOSPADM

## 2025-04-02 RX ORDER — SODIUM CHLORIDE 0.9 % (FLUSH) 0.9 %
5-40 SYRINGE (ML) INJECTION EVERY 12 HOURS SCHEDULED
Status: DISCONTINUED | OUTPATIENT
Start: 2025-04-02 | End: 2025-04-02 | Stop reason: HOSPADM

## 2025-04-02 RX ORDER — SODIUM CHLORIDE 9 MG/ML
INJECTION, SOLUTION INTRAVENOUS CONTINUOUS
Status: DISCONTINUED | OUTPATIENT
Start: 2025-04-02 | End: 2025-04-02 | Stop reason: HOSPADM

## 2025-04-02 RX ORDER — OXYCODONE AND ACETAMINOPHEN 5; 325 MG/1; MG/1
1 TABLET ORAL EVERY 6 HOURS PRN
Qty: 12 TABLET | Refills: 0 | Status: SHIPPED | OUTPATIENT
Start: 2025-04-02 | End: 2025-04-05

## 2025-04-02 RX ORDER — FENTANYL CITRATE 50 UG/ML
50 INJECTION, SOLUTION INTRAMUSCULAR; INTRAVENOUS EVERY 5 MIN PRN
Status: DISCONTINUED | OUTPATIENT
Start: 2025-04-02 | End: 2025-04-02 | Stop reason: HOSPADM

## 2025-04-02 RX ORDER — HYOSCYAMINE SULFATE 0.12 MG/1
0.12 TABLET SUBLINGUAL ONCE
Status: COMPLETED | OUTPATIENT
Start: 2025-04-02 | End: 2025-04-02

## 2025-04-02 RX ORDER — ONDANSETRON 2 MG/ML
INJECTION INTRAMUSCULAR; INTRAVENOUS
Status: DISCONTINUED | OUTPATIENT
Start: 2025-04-02 | End: 2025-04-02 | Stop reason: SDUPTHER

## 2025-04-02 RX ORDER — FENTANYL CITRATE 50 UG/ML
25 INJECTION, SOLUTION INTRAMUSCULAR; INTRAVENOUS EVERY 5 MIN PRN
Status: DISCONTINUED | OUTPATIENT
Start: 2025-04-02 | End: 2025-04-02 | Stop reason: HOSPADM

## 2025-04-02 RX ORDER — CIPROFLOXACIN 500 MG/1
500 TABLET, FILM COATED ORAL 2 TIMES DAILY
Qty: 14 TABLET | Refills: 0 | Status: SHIPPED | OUTPATIENT
Start: 2025-04-02 | End: 2025-04-09

## 2025-04-02 RX ORDER — OXYCODONE AND ACETAMINOPHEN 5; 325 MG/1; MG/1
2 TABLET ORAL EVERY 4 HOURS PRN
Refills: 0 | Status: DISCONTINUED | OUTPATIENT
Start: 2025-04-02 | End: 2025-04-02 | Stop reason: HOSPADM

## 2025-04-02 RX ORDER — PROPOFOL 10 MG/ML
INJECTION, EMULSION INTRAVENOUS
Status: DISCONTINUED | OUTPATIENT
Start: 2025-04-02 | End: 2025-04-02 | Stop reason: SDUPTHER

## 2025-04-02 RX ORDER — DEXAMETHASONE SODIUM PHOSPHATE 4 MG/ML
4 INJECTION, SOLUTION INTRA-ARTICULAR; INTRALESIONAL; INTRAMUSCULAR; INTRAVENOUS; SOFT TISSUE ONCE
Status: COMPLETED | OUTPATIENT
Start: 2025-04-02 | End: 2025-04-02

## 2025-04-02 RX ORDER — SODIUM CHLORIDE, SODIUM LACTATE, POTASSIUM CHLORIDE, CALCIUM CHLORIDE 600; 310; 30; 20 MG/100ML; MG/100ML; MG/100ML; MG/100ML
INJECTION, SOLUTION INTRAVENOUS
Status: DISCONTINUED | OUTPATIENT
Start: 2025-04-02 | End: 2025-04-02 | Stop reason: SDUPTHER

## 2025-04-02 RX ORDER — NALOXONE HYDROCHLORIDE 0.4 MG/ML
INJECTION, SOLUTION INTRAMUSCULAR; INTRAVENOUS; SUBCUTANEOUS PRN
Status: DISCONTINUED | OUTPATIENT
Start: 2025-04-02 | End: 2025-04-02 | Stop reason: HOSPADM

## 2025-04-02 RX ORDER — TAMSULOSIN HYDROCHLORIDE 0.4 MG/1
0.4 CAPSULE ORAL ONCE
Status: COMPLETED | OUTPATIENT
Start: 2025-04-02 | End: 2025-04-02

## 2025-04-02 RX ORDER — LIDOCAINE HYDROCHLORIDE 20 MG/ML
INJECTION, SOLUTION EPIDURAL; INFILTRATION; INTRACAUDAL; PERINEURAL
Status: DISCONTINUED | OUTPATIENT
Start: 2025-04-02 | End: 2025-04-02 | Stop reason: SDUPTHER

## 2025-04-02 RX ORDER — IOPAMIDOL 612 MG/ML
INJECTION, SOLUTION INTRAVASCULAR PRN
Status: DISCONTINUED | OUTPATIENT
Start: 2025-04-02 | End: 2025-04-02 | Stop reason: HOSPADM

## 2025-04-02 RX ORDER — LIDOCAINE HYDROCHLORIDE 10 MG/ML
1 INJECTION, SOLUTION EPIDURAL; INFILTRATION; INTRACAUDAL; PERINEURAL
Status: DISCONTINUED | OUTPATIENT
Start: 2025-04-02 | End: 2025-04-02 | Stop reason: HOSPADM

## 2025-04-02 RX ORDER — DIPHENHYDRAMINE HYDROCHLORIDE 50 MG/ML
12.5 INJECTION, SOLUTION INTRAMUSCULAR; INTRAVENOUS
Status: DISCONTINUED | OUTPATIENT
Start: 2025-04-02 | End: 2025-04-02 | Stop reason: HOSPADM

## 2025-04-02 RX ORDER — ONDANSETRON 2 MG/ML
4 INJECTION INTRAMUSCULAR; INTRAVENOUS
Status: DISCONTINUED | OUTPATIENT
Start: 2025-04-02 | End: 2025-04-02 | Stop reason: HOSPADM

## 2025-04-02 RX ORDER — MIDAZOLAM HYDROCHLORIDE 2 MG/2ML
2 INJECTION, SOLUTION INTRAMUSCULAR; INTRAVENOUS
Status: DISCONTINUED | OUTPATIENT
Start: 2025-04-02 | End: 2025-04-02 | Stop reason: HOSPADM

## 2025-04-02 RX ORDER — HYDROMORPHONE HYDROCHLORIDE 1 MG/ML
1 INJECTION, SOLUTION INTRAMUSCULAR; INTRAVENOUS; SUBCUTANEOUS
Refills: 0 | Status: DISCONTINUED | OUTPATIENT
Start: 2025-04-02 | End: 2025-04-02 | Stop reason: HOSPADM

## 2025-04-02 RX ADMIN — ONDANSETRON 4 MG: 2 INJECTION INTRAMUSCULAR; INTRAVENOUS at 14:18

## 2025-04-02 RX ADMIN — ROCURONIUM BROMIDE 50 MG: 10 INJECTION, SOLUTION INTRAVENOUS at 12:22

## 2025-04-02 RX ADMIN — HYOSCYAMINE SULFATE 0.12 MG: 0.12 TABLET SUBLINGUAL at 15:11

## 2025-04-02 RX ADMIN — ROCURONIUM BROMIDE 20 MG: 10 INJECTION, SOLUTION INTRAVENOUS at 13:51

## 2025-04-02 RX ADMIN — LIDOCAINE HYDROCHLORIDE 100 MG: 20 INJECTION, SOLUTION EPIDURAL; INFILTRATION; INTRACAUDAL; PERINEURAL at 12:20

## 2025-04-02 RX ADMIN — FENTANYL CITRATE 50 MCG: 0.05 INJECTION, SOLUTION INTRAMUSCULAR; INTRAVENOUS at 12:46

## 2025-04-02 RX ADMIN — TAMSULOSIN HYDROCHLORIDE 0.4 MG: 0.4 CAPSULE ORAL at 15:02

## 2025-04-02 RX ADMIN — FENTANYL CITRATE 100 MCG: 0.05 INJECTION, SOLUTION INTRAMUSCULAR; INTRAVENOUS at 12:20

## 2025-04-02 RX ADMIN — SUGAMMADEX 300 MG: 100 INJECTION, SOLUTION INTRAVENOUS at 14:26

## 2025-04-02 RX ADMIN — ROCURONIUM BROMIDE 20 MG: 10 INJECTION, SOLUTION INTRAVENOUS at 13:03

## 2025-04-02 RX ADMIN — SODIUM CHLORIDE, SODIUM LACTATE, POTASSIUM CHLORIDE, AND CALCIUM CHLORIDE: 600; 310; 30; 20 INJECTION, SOLUTION INTRAVENOUS at 13:36

## 2025-04-02 RX ADMIN — PROPOFOL 300 MG: 10 INJECTION, EMULSION INTRAVENOUS at 12:21

## 2025-04-02 RX ADMIN — SODIUM CHLORIDE, PRESERVATIVE FREE 20 MG: 5 INJECTION INTRAVENOUS at 09:42

## 2025-04-02 RX ADMIN — FENTANYL CITRATE 50 MCG: 0.05 INJECTION, SOLUTION INTRAMUSCULAR; INTRAVENOUS at 13:51

## 2025-04-02 RX ADMIN — MIDAZOLAM 2 MG: 1 INJECTION INTRAMUSCULAR; INTRAVENOUS at 12:17

## 2025-04-02 RX ADMIN — DEXAMETHASONE SODIUM PHOSPHATE 4 MG: 4 INJECTION INTRA-ARTICULAR; INTRALESIONAL; INTRAMUSCULAR; INTRAVENOUS; SOFT TISSUE at 09:42

## 2025-04-02 RX ADMIN — CEFTRIAXONE SODIUM 1000 MG: 1 INJECTION, POWDER, FOR SOLUTION INTRAMUSCULAR; INTRAVENOUS at 12:31

## 2025-04-02 RX ADMIN — SODIUM CHLORIDE, SODIUM LACTATE, POTASSIUM CHLORIDE, AND CALCIUM CHLORIDE: 600; 310; 30; 20 INJECTION, SOLUTION INTRAVENOUS at 12:17

## 2025-04-02 ASSESSMENT — PAIN - FUNCTIONAL ASSESSMENT
PAIN_FUNCTIONAL_ASSESSMENT: NONE - DENIES PAIN
PAIN_FUNCTIONAL_ASSESSMENT: NONE - DENIES PAIN
PAIN_FUNCTIONAL_ASSESSMENT: 0-10

## 2025-04-02 NOTE — INTERVAL H&P NOTE
Update History & Physical    The patient's History and Physical of March 25, 2025 was reviewed with the patient and I examined the patient. There was no change. The surgical site was confirmed by the patient and me.     Plan: The risks, benefits, expected outcome, and alternative to the recommended procedure have been discussed with the patient. Patient understands and wants to proceed with the procedure.     Electronically signed by GEOVANNA CRAIG MD on 4/2/2025 at 11:44 AM

## 2025-04-02 NOTE — ANESTHESIA POSTPROCEDURE EVALUATION
Department of Anesthesiology  Postprocedure Note    Patient: Eldon Nichole  MRN: [id number]  YOB: 1965  Date of evaluation: 4/2/2025    Procedure Summary       Date: 04/02/25 Room / Location: [facility]    Anesthesia Start: 1217 Anesthesia Stop: 1439    Procedures:       CYSTOSCOPY, RIGHT URETEROSCOPY LASER LITHOTRIPSY, STONE BASKET EXTRACTION (Right)      RIGHT URETERAL STENT REMOVAL, RIGHT URETERAL STENT REPLACEMENT (Right) Diagnosis:       Retained ureteral stent      Right ureteral stone      (Retained ureteral stent [Z96.0])      (Right ureteral stone [N20.1])    Surgeons: Phuc Harp MD Responsible Provider: Kianna Arora APRN - CRNA    Anesthesia Type: General ASA Status: 2            Anesthesia Type: General    Erica Phase I: Erica Score: 8    Erica Phase II:      Anesthesia Post Evaluation    Patient location during evaluation: PACU  Patient participation: complete - patient participated  Level of consciousness: awake  Pain score: 0  Airway patency: patent  Nausea & Vomiting: no nausea and no vomiting  Cardiovascular status: blood pressure returned to baseline  Respiratory status: acceptable, room air and spontaneous ventilation  Hydration status: euvolemic  Pain management: adequate    No notable events documented.

## 2025-04-02 NOTE — OP NOTE
Operative Note      Patient: Eldon Nichole  YOB: 1965  MRN: [id number]    Date of Procedure: 4/2/2025    Pre-Op Diagnosis Codes:      * Retained ureteral stent [Z96.0]     * Renal calculus, right [N20.0]    Post-Op Diagnosis: Same       Procedure(s):  CYSTOSCOPY, RIGHT URETEROSCOPY LASER LITHOTRIPSY, STONE BASKET EXTRACTION  RIGHT URETERAL STENT REMOVAL, RIGHT URETERAL STENT REPLACEMENT  Modifier 22 for prolonged procedure over a of will be considered normal or standard    Surgeon(s):  Phuc Harp MD    Assistant:   * No surgical staff found *    Anesthesia: General    Estimated Blood Loss (mL): 0    Complications: None    Specimens:   ID Type Source Tests Collected by Time Destination   1 : right ureteral calculi Stone (Calculus) Ureter STONE ANALYSIS Phuc Harp MD 4/2/2025 1306        Implants:  Implant Name Type Inv. Item Serial No.  Lot No. LRB No. Used Action   STENT URETERAL 4.8 FRX24 CM FIRM BRAIDED TRIA - [id number]  STENT URETERAL 4.8 FRX24 CM FIRM BRAIDED TRIA  [employer]WD [id number] Right 1 Implanted         Drains:   [REMOVED] Ureteral Drain/Stent 03/09/25 Right Ureter (Removed)       [REMOVED] Urinary Catheter 03/09/25 [place] (Removed)   Catheter Indications Perioperative use for selected surgical procedures 03/10/25 1423   Site Assessment No urethral drainage 03/10/25 1423   Urine Color Kathy;Pink 03/10/25 1423   Urine Appearance Sediment 03/10/25 1423   Collection Container Standard 03/10/25 1423   Securement Method Securing device (Describe) 03/10/25 1423   Catheter Care  Soap and water 03/10/25 1026   Output (mL) 750 mL 03/11/25 0037       Findings:  Infection Present At Time Of Surgery (PATOS) (choose all levels that have infection present):  No infection present  Other Findings: Patient required multiple passes with the ureteroscope for basket extraction and laser litho for multiple stones in multiple locations.  There was a matrix stone that

## 2025-04-02 NOTE — ANESTHESIA PRE PROCEDURE
Department of Anesthesiology  Preprocedure Note       Name:  Eldon Nichole   Age:  59 y.o.  :  1965                                          MRN:  [id number]         Date:  2025      Surgeon: Surgeon(s):  Phuc Harp MD    Procedure: Procedure(s):  CYSTOSCOPY RIGHT URETEROSCOPY LASER LITHOTRIPSY, STONE BASKET EXTRACTION  RIGHT URETERAL STENT REMOVAL, RIGHT URETERAL STENT REPLACEMENT    Medications prior to admission:   Prior to Admission medications    Medication Sig Start Date End Date Taking? Authorizing Provider   oxyCODONE-acetaminophen (PERCOCET) 5-325 MG per tablet Take 1 tablet by mouth every 4 hours as needed for Pain.   Yes ProviderSantana MD   acetaminophen (TYLENOL) 325 MG tablet Take 2 tablets by mouth every 6 hours as needed for Pain or Fever   Yes ProviderSantana MD   ibuprofen (ADVIL;MOTRIN) 400 MG tablet Take 1 tablet by mouth every 6 hours as needed for Pain or Fever   Yes ProviderSantana MD   lisinopril (PRINIVIL;ZESTRIL) 10 MG tablet Take 1 tablet by mouth daily 7/10/24  Yes ProviderSantana MD   tamsulosin (FLOMAX) 0.4 MG capsule Take 1 capsule by mouth daily 3/12/25  Yes Nelia Keys MD   phenazopyridine (PYRIDIUM) 200 MG tablet Take 1 tablet by mouth 3 times daily as needed  Patient not taking: Reported on 2025 3/12/25   Santana Miller MD   phenazopyridine (PYRIDIUM) 100 MG tablet Take 1 tablet by mouth 2 times daily as needed for Pain  Patient not taking: Reported on 2025 3/12/25 3/12/26  Joanna Bai APRN - CNP       Current medications:    Current Facility-Administered Medications   Medication Dose Route Frequency Provider Last Rate Last Admin    cefTRIAXone (ROCEPHIN) 1,000 mg in sterile water 10 mL IV syringe  1,000 mg IntraVENous On Call to OR Jennifer Reyna APRN - CNP           Allergies:  No Known Allergies    Problem List:    Patient Active Problem List   Diagnosis Code    Urinary tract infection N39.0

## 2025-04-02 NOTE — DISCHARGE INSTRUCTIONS
[facility], Dr Harp    Cystoscopy / Ureteroscopy / Bladder Endoscopy Procedures Discharge Instructions      You may experience : Burning sensation when you void     A feeling of a need to go to the bathroom frequently     You may have urgent urination     Your urine may be blood tinged    These symptoms should be relieved within a few hours and days  as you increase the amounts of fluids you drink and the number of times that you empty your bladder. They may persist for 1-2 weeks if you have a stent or had a biopsy or resection.  We recommended that you drink plenty of fluid a few days after surgery.    If you have a ureteral stent he may have pain in your side or back related to the stent. Stents also cause bladder irritation symptoms of frequency and urgency.    No strenuous activities for 1 week or  until you talk to your doctor.    You may feel light headed up to 24 hours after anesthesia.  You should not do the following for the next 24 hours.       Drive a car, operate machinery or power tools     Drink any alcoholic drinks (not even beer or wine)     Make any important decisions, i.e., signing important papers.    It is recommended that you begin with clear liquids and/or light foods.  If you  Are not nauseated, progress to your normal diet.    I you are unable to urinate you should call your surgeon.    Dr. Phuc Harp

## 2025-04-03 RX ORDER — MIDAZOLAM HYDROCHLORIDE 1 MG/ML
INJECTION, SOLUTION INTRAMUSCULAR; INTRAVENOUS
Status: DISCONTINUED | OUTPATIENT
Start: 2025-04-02 | End: 2025-04-03 | Stop reason: SDUPTHER

## 2025-04-03 NOTE — ADDENDUM NOTE
Addendum  created 04/03/25 0842 by Kianna Arora APRN - NOÉ    Intraprocedure Meds edited, Orders acknowledged in Narrator

## 2025-04-03 NOTE — OP NOTE
[address] [place] [address]                            OPERATIVE REPORT      PATIENT NAME: PADMINI HAMLIN                : 1965  MED REC NO: [id number]                          ROOM: [facility]  ACCOUNT NO: [id number]                       ADMIT DATE: 2025  PROVIDER: Phuc Harp MD      DATE OF PROCEDURE:  2025    SURGEON:  Phuc Harp MD    TITLE OF OPERATION:    1. Cystoscopy, right ureteral stent removal, distinct and separate portion of procedure.  2. Right ureteroscopy, laser lithotripsy, and placement of right ureteral stent, 4.8-Slovenian x 24 cm Tria firm ureteral stent.  3. Right ureteroscopy, stone basket extraction, a distinct and necessary procedure for distinct and necessary separate stones and to render the patient stone free as possible.  4. Modifier 22 for prolonged procedure over and above what would be considered normal for a ureteroscopy, laser lithotripsy, and stone basket extraction because of multiple distinct separate stones in multiple locations requiring multiple passes of the ureteroscope and multiple basket extractions to render stone free.    PREOPERATIVE DIAGNOSES:    1. Retained right ureteral stent.  2. Multiple right renal calculi.    POSTOPERATIVE DIAGNOSES:    1. Retained right ureteral stent.  2. Multiple right renal calculi.    ANESTHESIA:  General anesthetic.    ESTIMATED BLOOD LOSS:  0 mL.    HISTORY:  The patient is a 59-year-old gentleman, who presented with obstructive pyelonephritis and grew out Proteus back on .  He was treated with a 21-day course of culture-specific antibiotics.  He has now nearly completed his antibiotics.  At that time, imaging study showed some stone debris in the right UPJ as well as upper and lower poles of the right kidney with some perinephric stranding.  The patient has some mild renal insufficiency.  He was seen for followup in our office

## 2025-04-07 ENCOUNTER — TELEPHONE (OUTPATIENT)
Dept: UROLOGY | Age: 60
End: 2025-04-07

## 2025-04-07 LAB
APPEARANCE STONE: NORMAL
COMPN STONE: NORMAL
SPECIMEN WT: 120 MG

## 2025-04-07 NOTE — TELEPHONE ENCOUNTER
Attempted to reach patient to schedule post op appointment for stent removal - no answer, unable to leave VM.

## 2025-04-08 PROBLEM — N39.0 URINARY TRACT INFECTION: Status: RESOLVED | Noted: 2025-03-09 | Resolved: 2025-04-08

## 2025-04-09 ENCOUNTER — OFFICE VISIT (OUTPATIENT)
Dept: UROLOGY | Age: 60
End: 2025-04-09
Payer: MEDICARE

## 2025-04-09 VITALS — BODY MASS INDEX: 42.66 KG/M2 | TEMPERATURE: 97.3 F | HEIGHT: 72 IN | WEIGHT: 315 LBS

## 2025-04-09 DIAGNOSIS — N20.1 OBSTRUCTION OF RIGHT URETEROPELVIC JUNCTION (UPJ) DUE TO STONE: ICD-10-CM

## 2025-04-09 DIAGNOSIS — N20.0 RIGHT RENAL STONE: ICD-10-CM

## 2025-04-09 DIAGNOSIS — Z96.0 RETAINED URETERAL STENT: ICD-10-CM

## 2025-04-09 DIAGNOSIS — N20.1 RIGHT URETERAL STONE: Primary | ICD-10-CM

## 2025-04-09 LAB
BACTERIA URINE, POC: 0
BILIRUBIN URINE: 0 MG/DL
BLOOD, URINE: POSITIVE
CASTS URINE, POC: 0
CLARITY, UA: CLEAR
COLOR, UA: YELLOW
CRYSTALS URINE, POC: 0
EPI CELLS URINE, POC: ABNORMAL
GLUCOSE URINE: ABNORMAL
KETONES, URINE: NEGATIVE
LEUKOCYTE EST, POC: ABNORMAL
NITRITE, URINE: NEGATIVE
PH UA: 6 (ref 4.5–8)
PROTEIN UA: POSITIVE
RBC URINE, POC: 20
SPECIFIC GRAVITY UA: 1.02 (ref 1–1.03)
UROBILINOGEN, URINE: ABNORMAL
WBC URINE, POC: 4
YEAST URINE, POC: 0

## 2025-04-09 PROCEDURE — 3017F COLORECTAL CA SCREEN DOC REV: CPT | Performed by: NURSE PRACTITIONER

## 2025-04-09 PROCEDURE — 1111F DSCHRG MED/CURRENT MED MERGE: CPT | Performed by: NURSE PRACTITIONER

## 2025-04-09 PROCEDURE — G8417 CALC BMI ABV UP PARAM F/U: HCPCS | Performed by: NURSE PRACTITIONER

## 2025-04-09 PROCEDURE — 99213 OFFICE O/P EST LOW 20 MIN: CPT | Performed by: NURSE PRACTITIONER

## 2025-04-09 PROCEDURE — 81001 URINALYSIS AUTO W/SCOPE: CPT | Performed by: NURSE PRACTITIONER

## 2025-04-09 PROCEDURE — 4004F PT TOBACCO SCREEN RCVD TLK: CPT | Performed by: NURSE PRACTITIONER

## 2025-04-09 PROCEDURE — G8427 DOCREV CUR MEDS BY ELIG CLIN: HCPCS | Performed by: NURSE PRACTITIONER

## 2025-04-09 NOTE — PROGRESS NOTES
Patient of Dr. Harp states they are here today to have stent removed S.P. renal stone on 04/02/2025. Patient denies any fever, chills, nausea or vomiting.The string was located and stent was pulled with no complications. The patient was advised to continue any medications prescribed by Dr. Harp or CHRISTINE Del Rosario and to call if they have any questions or concerns. Patient verbalized understanding. Patient will follow up as scheduled.     Linda Reyna was in office at time of procedure.  
  Procedures    CT ABDOMEN PELVIS WO CONTRAST Additional Contrast? None     For renal stone protocol     Standing Status:   Future     Expected Date:   6/4/2025     Expiration Date:   4/9/2026     Scheduling Instructions:      For renal stone protocol     Additional Contrast?:   None     Reason for exam::   post op ureteroscopy r/o stricture. assess stone burden    POCT Urinalysis Dipstick w/ Micro (Auto)        Return for 8-12 wks with me CT prior .    All information inputted into the note by the MA to include chief complaint, past medical history, past surgical history, medications, allergies, social and family history and review of systems has been reviewed and updated as needed by me.    EMR Dragon/transcription disclaimer: Much of this documentt is electronic  transcription/translation of spoken language to printed text. The  electronic translation of spoken language may be erroneous, or at times,  nonsensical words or phrases may be inadvertently transcribed. Although I  have reviewed the document for such errors, some may still exist.

## 2025-04-11 ASSESSMENT — ENCOUNTER SYMPTOMS
NAUSEA: 0
VOMITING: 0
ABDOMINAL PAIN: 0
ABDOMINAL DISTENTION: 0
BACK PAIN: 0

## 2025-06-18 ENCOUNTER — HOSPITAL ENCOUNTER (OUTPATIENT)
Dept: CT IMAGING | Age: 60
Discharge: HOME | End: 2025-06-18
Payer: MEDICARE

## 2025-06-18 DIAGNOSIS — N20.1 RIGHT URETERAL STONE: ICD-10-CM

## 2025-06-18 PROCEDURE — 74176 CT ABD & PELVIS W/O CONTRAST: CPT

## 2025-07-02 ENCOUNTER — OFFICE VISIT (OUTPATIENT)
Dept: UROLOGY | Age: 60
End: 2025-07-02
Payer: MEDICARE

## 2025-07-02 VITALS — WEIGHT: 315 LBS | BODY MASS INDEX: 42.66 KG/M2 | TEMPERATURE: 97.2 F | HEIGHT: 72 IN

## 2025-07-02 DIAGNOSIS — N20.0 RIGHT RENAL STONE: ICD-10-CM

## 2025-07-02 DIAGNOSIS — N20.1 RIGHT URETERAL STONE: Primary | ICD-10-CM

## 2025-07-02 PROCEDURE — G8427 DOCREV CUR MEDS BY ELIG CLIN: HCPCS | Performed by: NURSE PRACTITIONER

## 2025-07-02 PROCEDURE — 99213 OFFICE O/P EST LOW 20 MIN: CPT | Performed by: NURSE PRACTITIONER

## 2025-07-02 PROCEDURE — 3017F COLORECTAL CA SCREEN DOC REV: CPT | Performed by: NURSE PRACTITIONER

## 2025-07-02 PROCEDURE — G8417 CALC BMI ABV UP PARAM F/U: HCPCS | Performed by: NURSE PRACTITIONER

## 2025-07-02 PROCEDURE — 4004F PT TOBACCO SCREEN RCVD TLK: CPT | Performed by: NURSE PRACTITIONER

## 2025-07-02 NOTE — PROGRESS NOTES
VIEW)     Standing Status:   Future     Expected Date:   7/2/2026     Expiration Date:   7/2/2026        Return in about 1 year (around 7/2/2026) for KUB prior.    All information inputted into the note by the MA to include chief complaint, past medical history, past surgical history, medications, allergies, social and family history and review of systems has been reviewed and updated as needed by me.    EMR Dragon/transcription disclaimer: Much of this documentt is electronic  transcription/translation of spoken language to printed text. The  electronic translation of spoken language may be erroneous, or at times,  nonsensical words or phrases may be inadvertently transcribed. Although I  have reviewed the document for such errors, some may still exist.

## 2025-07-07 ASSESSMENT — ENCOUNTER SYMPTOMS
VOMITING: 0
BACK PAIN: 0
ABDOMINAL PAIN: 0
NAUSEA: 0
ABDOMINAL DISTENTION: 0

## (undated) DEVICE — URETERAL ACCESS SHEATH SET: Brand: NAVIGATOR HD

## (undated) DEVICE — SINGLE-USE DIGITAL FLEXIBLE URETEROSCOPE: Brand: LITHOVUE

## (undated) DEVICE — Device

## (undated) DEVICE — CATH ENDOVENOUS RF/ABL CLOSUREFAST 7F 100CM

## (undated) DEVICE — GLOVE SURG SZ 75 CRM LTX FREE POLYISOPRENE POLYMER BEAD ANTI

## (undated) DEVICE — PAD MINOR UNIVERSAL: Brand: MEDLINE INDUSTRIES, INC.

## (undated) DEVICE — STRIP,CLOSURE,WOUND,MEDI-STRIP,1/2X4: Brand: MEDLINE

## (undated) DEVICE — I-GEL, MEDIUM ADULT, SUPRAGLOTTIC AIRWAY, SIZE 4 (50-90KG): Brand: I-GEL, MEDIUM ADULT, SUPRAGLOTTIC AIRWAY, SIZE 4 (50-90KG)

## (undated) DEVICE — INTENDED FOR TISSUE SEPARATION, AND OTHER PROCEDURES THAT REQUIRE A SHARP SURGICAL BLADE TO PUNCTURE OR CUT.: Brand: BARD-PARKER ® STAINLESS STEEL BLADES

## (undated) DEVICE — TUBE ET 8MM NSL ORAL BASIC CUF INTMED MURPHY EYE RADPQ MRK

## (undated) DEVICE — GAUZE,SPONGE,4"X4",12PLY,STERILE,LF,2'S: Brand: MEDLINE

## (undated) DEVICE — STPCK 4WY ON/OFF VLV M/COLAR FIT 45PSI STRL

## (undated) DEVICE — I-GEL SUPRAGLOTTIC AIRWAY, SIZE 5: Brand: I-GEL SUPRAGLOTTIC AIRWAY, SIZE 5

## (undated) DEVICE — ST INF 2NDARY 20DRP VNT/NOVNT 30IN

## (undated) DEVICE — CATHETER URET 5FR L70CM OPN END SGL LUMN INJ HUB FLEXIMA

## (undated) DEVICE — BANDAGE,GAUZE,BULKEE II,4.5"X4.1YD,STRL: Brand: MEDLINE

## (undated) DEVICE — BAG DRNGE COMB PK

## (undated) DEVICE — Y-TYPE TUR/BLADDER IRRIGATION SET, REGULATING CLAMP

## (undated) DEVICE — SEAL ENDO INSTR SELF SEAL UROLOGY

## (undated) DEVICE — GLV SURG SENSICARE W/ALOE PF LF 7.5 STRL

## (undated) DEVICE — SOLUTION IRRIG 3000ML 0.9% SOD CHL USP UROMATIC PLAS CONT

## (undated) DEVICE — SHEATH INTRO MICRO 7F 11CM

## (undated) DEVICE — SET IRRIG L94IN DIA0.281IN L BOR W/ 2 N VENT SPIK 2 ON/OFF

## (undated) DEVICE — GUIDEWIRE ENDOSCP L150CM DIA0.035IN TIP 3CM PTFE NIT

## (undated) DEVICE — NITINOL STONE RETRIEVAL DEVICE: Brand: DAKOTA

## (undated) DEVICE — SOLUTION IRRIG 1000ML STRL H2O USP PLAS POUR BTL

## (undated) DEVICE — SOL IRR SOD CHL 0.9% TITAN XL CNTNR 3000ML

## (undated) DEVICE — CONTAINER,SPECIMEN,OR STERILE,4OZ: Brand: MEDLINE

## (undated) DEVICE — 1LYRTR 16FR10ML100%SIL UMS SNP: Brand: MEDLINE INDUSTRIES, INC.

## (undated) DEVICE — INTENDED FOR TISSUE SEPARATION, AND OTHER PROCEDURES THAT REQUIRE A SHARP SURGICAL BLADE TO PUNCTURE OR CUT.: Brand: BARD-PARKER ®  SAFETY SCALPED

## (undated) DEVICE — RADIFOCUS GLIDEWIRE ADVANTAGE GUIDEWIRE: Brand: GLIDEWIRE ADVANTAGE

## (undated) DEVICE — BNDG ELAS ECON W/CLIP 4IN 5YD LF STRL

## (undated) DEVICE — BNDG ADHS CURAD FLX/FABRC 1X3IN STRL LF

## (undated) DEVICE — BNDG ELAS W/CLIP 6IN 10YD LF STRL

## (undated) DEVICE — STERILE (14X122CM) TELESCOPICALLY-FOLDED COVER: Brand: CIV-CLEAR™ TRANSDUCER COVER

## (undated) DEVICE — FIBER LASER 200UM 2J 80HZ 60W D F L FOR LITHO MOSES

## (undated) DEVICE — STERILE ULTRASOUND GEL, SAFEWRAP: Brand: ECOVUE

## (undated) DEVICE — SYRINGE 20ML LL S/C 50

## (undated) DEVICE — NEEDLE, QUINCKE, 20GX3.5": Brand: MEDLINE

## (undated) DEVICE — SYR LL TP 10ML STRL

## (undated) DEVICE — ST TB EXT STANDARDBORE 30IN